# Patient Record
Sex: MALE | Race: WHITE | NOT HISPANIC OR LATINO | Employment: UNEMPLOYED | URBAN - METROPOLITAN AREA
[De-identification: names, ages, dates, MRNs, and addresses within clinical notes are randomized per-mention and may not be internally consistent; named-entity substitution may affect disease eponyms.]

---

## 2022-04-11 ENCOUNTER — HISTORICAL (OUTPATIENT)
Dept: ADMINISTRATIVE | Facility: HOSPITAL | Age: 42
End: 2022-04-11

## 2022-04-28 VITALS
WEIGHT: 184.06 LBS | SYSTOLIC BLOOD PRESSURE: 122 MMHG | DIASTOLIC BLOOD PRESSURE: 81 MMHG | HEIGHT: 72 IN | BODY MASS INDEX: 24.93 KG/M2

## 2023-09-26 ENCOUNTER — HOSPITAL ENCOUNTER (EMERGENCY)
Facility: HOSPITAL | Age: 43
Discharge: HOME | End: 2023-09-26
Attending: INTERNAL MEDICINE
Payer: MEDICAID

## 2023-09-26 VITALS
HEIGHT: 72 IN | WEIGHT: 200.63 LBS | SYSTOLIC BLOOD PRESSURE: 111 MMHG | TEMPERATURE: 98 F | BODY MASS INDEX: 27.17 KG/M2 | DIASTOLIC BLOOD PRESSURE: 73 MMHG | OXYGEN SATURATION: 100 % | RESPIRATION RATE: 18 BRPM | HEART RATE: 108 BPM

## 2023-09-26 DIAGNOSIS — F19.10 POLYSUBSTANCE ABUSE: Primary | ICD-10-CM

## 2023-09-26 PROCEDURE — 99281 EMR DPT VST MAYX REQ PHY/QHP: CPT

## 2023-09-26 NOTE — ED NOTES
Pt asked if he could go out side and smoke.  Informed pt this is a non smoking facility and that he couldn't go smoke.  States he is just going to go home and left the er.  Md notified

## 2023-09-26 NOTE — ED PROVIDER NOTES
"Encounter Date: 9/26/2023       History     Chief Complaint   Patient presents with    Mental Health Problem     PT STATES HE IS STRESSED OUT AND NOT SURE WHAT TO DO.  DENIES SI/HI.  -VH + AH.  REQUESTING REFILL ON MEDS.  STATES HE IS DETOXING OFF OF "KRATOM"  PT FIDGETY IN TRIAGE, PT WANDED.      Presents requesting help with drug abuse. States having difficulties keeping a job and is having problems with his parents. States abuses of meth, kratom and opioids.   States was in a rehabilitation facility around 3 months ago, 28 days program, but relapsed. Taking Seroquel and Prozac. Denies SI, H or hallucinations.     The history is provided by the patient.     Review of patient's allergies indicates:  No Known Allergies  History reviewed. No pertinent past medical history.  History reviewed. No pertinent surgical history.  History reviewed. No pertinent family history.  Social History     Tobacco Use    Smoking status: Every Day     Current packs/day: 1.00     Average packs/day: 1 pack/day for 24.7 years (24.7 ttl pk-yrs)     Types: Cigarettes     Start date: 1999    Smokeless tobacco: Never   Substance Use Topics    Alcohol use: Not Currently    Drug use: Yes     Types: Methamphetamines     Comment: "KRATOM"     Review of Systems   Constitutional:  Negative for fever.   HENT:  Negative for sore throat.    Respiratory:  Negative for shortness of breath.    Cardiovascular:  Negative for chest pain.   Gastrointestinal:  Negative for nausea.   Genitourinary:  Negative for dysuria.   Musculoskeletal:  Negative for back pain.   Skin:  Negative for rash.   Neurological:  Negative for weakness.   Hematological:  Does not bruise/bleed easily.   Psychiatric/Behavioral:  Negative for confusion, hallucinations, self-injury and suicidal ideas. The patient is nervous/anxious.    All other systems reviewed and are negative.      Physical Exam     Initial Vitals [09/26/23 0857]   BP Pulse Resp Temp SpO2   111/73 108 18 97.9 °F " (36.6 °C) 100 %      MAP       --         Physical Exam    Nursing note and vitals reviewed.  Constitutional: He appears well-developed and well-nourished. No distress.   HENT:   Head: Normocephalic and atraumatic.   Mouth/Throat: Oropharynx is clear and moist.   Eyes: Conjunctivae and EOM are normal. Pupils are equal, round, and reactive to light.   Neck: Neck supple. No JVD present.   Normal range of motion.  Cardiovascular:  Regular rhythm, normal heart sounds and intact distal pulses.           Pulmonary/Chest: Breath sounds normal. No respiratory distress.   Abdominal: Abdomen is soft. Bowel sounds are normal. He exhibits no distension. There is no abdominal tenderness. There is no rebound and no guarding.   Musculoskeletal:         General: No edema. Normal range of motion.      Cervical back: Normal range of motion and neck supple.     Neurological: He is alert and oriented to person, place, and time. He has normal strength. GCS score is 15. GCS eye subscore is 4. GCS verbal subscore is 5. GCS motor subscore is 6.   Skin: Skin is warm and dry. No rash noted.   Psychiatric: His speech is normal and behavior is normal. Thought content normal. He is not actively hallucinating. Thought content is not paranoid and not delusional. Cognition and memory are normal. He expresses inappropriate judgment. He expresses no homicidal and no suicidal ideation. He is attentive.         ED Course   Procedures  Labs Reviewed - No data to display       Imaging Results    None          Medications - No data to display  Medical Decision Making  Amount and/or Complexity of Data Reviewed  Discussion of management or test interpretation with external provider(s): Case discussed with [facility] placement assistance      Additional MDM:   Differential Diagnosis:   Schizophrenia exacerbation, bipolar psychosis, drug induced psychosis, thyrotoxicosis, renal failure, liver failure, toxydrome, among others                                Clinical Impression:   Final diagnoses:  [F19.10] Polysubstance abuse (Primary)        ED Disposition Condition    Discharge Stable          ED Prescriptions    None       Follow-up Information       Follow up With Specialties Details Why Contact Info    [facility] - Emergency Dept Emergency Medicine  If symptoms worsen [address] [id number] [phone]    [facility], Psychiatry, Psychology Schedule an appointment as soon as possible for a visit in 2 weeks  [address] [place] [address]  [phone]      [facility]  Schedule an appointment as soon as possible for a visit in 2 days  [address] [address]  [phone]               Ayad Cerda MD  09/26/23 0957

## 2023-12-11 PROBLEM — F11.90 OPIOID USE DISORDER: Status: ACTIVE | Noted: 2023-12-11

## 2023-12-11 PROBLEM — F15.20 AMPHETAMINE USE DISORDER, SEVERE: Status: ACTIVE | Noted: 2023-12-11

## 2023-12-11 PROBLEM — F19.94 SUBSTANCE INDUCED MOOD DISORDER: Status: ACTIVE | Noted: 2023-12-11

## 2023-12-11 PROBLEM — F10.20 ALCOHOL USE DISORDER, SEVERE, DEPENDENCE: Status: ACTIVE | Noted: 2023-12-11

## 2023-12-11 PROBLEM — F14.10 COCAINE USE DISORDER: Status: ACTIVE | Noted: 2023-12-11

## 2023-12-12 PROBLEM — G40.909 SEIZURE DISORDER: Status: ACTIVE | Noted: 2023-12-12

## 2023-12-12 PROBLEM — F33.3 SEVERE RECURRENT MAJOR DEPRESSION WITH PSYCHOTIC FEATURES: Status: ACTIVE | Noted: 2023-12-12

## 2023-12-12 PROBLEM — F17.200 NICOTINE USE DISORDER: Status: ACTIVE | Noted: 2023-12-12

## 2023-12-12 PROBLEM — R74.01 TRANSAMINITIS: Status: ACTIVE | Noted: 2023-12-12

## 2023-12-25 ENCOUNTER — HOSPITAL ENCOUNTER (EMERGENCY)
Facility: HOSPITAL | Age: 43
Discharge: HOME | End: 2023-12-25
Attending: STUDENT IN AN ORGANIZED HEALTH CARE EDUCATION/TRAINING PROGRAM
Payer: MEDICAID

## 2023-12-25 VITALS
HEART RATE: 119 BPM | DIASTOLIC BLOOD PRESSURE: 70 MMHG | HEIGHT: 72 IN | SYSTOLIC BLOOD PRESSURE: 132 MMHG | OXYGEN SATURATION: 99 % | WEIGHT: 225 LBS | RESPIRATION RATE: 22 BRPM | TEMPERATURE: 99 F | BODY MASS INDEX: 30.48 KG/M2

## 2023-12-25 DIAGNOSIS — F19.10 POLYSUBSTANCE ABUSE: ICD-10-CM

## 2023-12-25 DIAGNOSIS — F15.10 METHAMPHETAMINE ABUSE: ICD-10-CM

## 2023-12-25 DIAGNOSIS — Z00.8 MEDICAL CLEARANCE FOR PSYCHIATRIC ADMISSION: Primary | ICD-10-CM

## 2023-12-25 LAB
ALBUMIN SERPL-MCNC: 4.3 G/DL (ref 3.5–5)
ALBUMIN/GLOB SERPL: 1.5 RATIO (ref 1.1–2)
ALP SERPL-CCNC: 64 UNIT/L (ref 40–150)
ALT SERPL-CCNC: 42 UNIT/L (ref 0–55)
AMPHET UR QL SCN: POSITIVE
APAP SERPL-MCNC: <17.4 UG/ML (ref 17.4–30)
APPEARANCE UR: CLEAR
AST SERPL-CCNC: 102 UNIT/L (ref 5–34)
BACTERIA #/AREA URNS AUTO: ABNORMAL /HPF
BARBITURATE SCN PRESENT UR: NEGATIVE
BASOPHILS # BLD AUTO: 0.04 X10(3)/MCL
BASOPHILS NFR BLD AUTO: 0.3 %
BENZODIAZ UR QL SCN: POSITIVE
BILIRUB SERPL-MCNC: 2.9 MG/DL
BILIRUB UR QL STRIP.AUTO: NEGATIVE
BUN SERPL-MCNC: 22.4 MG/DL (ref 8.9–20.6)
CALCIUM SERPL-MCNC: 8.7 MG/DL (ref 8.4–10.2)
CANNABINOIDS UR QL SCN: NEGATIVE
CASTS URNS MICRO: ABNORMAL /LPF
CHLORIDE SERPL-SCNC: 98 MMOL/L (ref 98–107)
CO2 SERPL-SCNC: 21 MMOL/L (ref 22–29)
COCAINE UR QL SCN: NEGATIVE
COLOR UR AUTO: ABNORMAL
CREAT SERPL-MCNC: 0.96 MG/DL (ref 0.73–1.18)
EOSINOPHIL # BLD AUTO: 0.08 X10(3)/MCL (ref 0–0.9)
EOSINOPHIL NFR BLD AUTO: 0.5 %
ERYTHROCYTE [DISTWIDTH] IN BLOOD BY AUTOMATED COUNT: 13.1 % (ref 11.5–17)
ETHANOL SERPL-MCNC: <10 MG/DL
FENTANYL UR QL SCN: NEGATIVE
FLUAV AG UPPER RESP QL IA.RAPID: NOT DETECTED
FLUBV AG UPPER RESP QL IA.RAPID: NOT DETECTED
GFR SERPLBLD CREATININE-BSD FMLA CKD-EPI: >60 MLS/MIN/1.73/M2
GLOBULIN SER-MCNC: 2.9 GM/DL (ref 2.4–3.5)
GLUCOSE SERPL-MCNC: 86 MG/DL (ref 74–100)
GLUCOSE UR QL STRIP.AUTO: NORMAL
HCT VFR BLD AUTO: 38.3 % (ref 42–52)
HGB BLD-MCNC: 13.1 G/DL (ref 14–18)
IMM GRANULOCYTES # BLD AUTO: 0.08 X10(3)/MCL (ref 0–0.04)
IMM GRANULOCYTES NFR BLD AUTO: 0.5 %
KETONES UR QL STRIP.AUTO: ABNORMAL
LEUKOCYTE ESTERASE UR QL STRIP.AUTO: NEGATIVE
LYMPHOCYTES # BLD AUTO: 1.26 X10(3)/MCL (ref 0.6–4.6)
LYMPHOCYTES NFR BLD AUTO: 8.3 %
MCH RBC QN AUTO: 30.8 PG (ref 27–31)
MCHC RBC AUTO-ENTMCNC: 34.2 G/DL (ref 33–36)
MCV RBC AUTO: 90.1 FL (ref 80–94)
MDMA UR QL SCN: NEGATIVE
MONOCYTES # BLD AUTO: 0.98 X10(3)/MCL (ref 0.1–1.3)
MONOCYTES NFR BLD AUTO: 6.5 %
NEUTROPHILS # BLD AUTO: 12.66 X10(3)/MCL (ref 2.1–9.2)
NEUTROPHILS NFR BLD AUTO: 83.9 %
NITRITE UR QL STRIP.AUTO: NEGATIVE
NRBC BLD AUTO-RTO: 0 %
OPIATES UR QL SCN: NEGATIVE
PCP UR QL: NEGATIVE
PH UR STRIP.AUTO: 5.5 [PH]
PH UR: 5.5 [PH] (ref 3–11)
PLATELET # BLD AUTO: 174 X10(3)/MCL (ref 130–400)
PMV BLD AUTO: 10.7 FL (ref 7.4–10.4)
POTASSIUM SERPL-SCNC: 3 MMOL/L (ref 3.5–5.1)
PROT SERPL-MCNC: 7.2 GM/DL (ref 6.4–8.3)
PROT UR QL STRIP.AUTO: NEGATIVE
RBC # BLD AUTO: 4.25 X10(6)/MCL (ref 4.7–6.1)
RBC #/AREA URNS AUTO: ABNORMAL /HPF
RBC UR QL AUTO: NEGATIVE
SARS-COV-2 RNA RESP QL NAA+PROBE: NOT DETECTED
SODIUM SERPL-SCNC: 133 MMOL/L (ref 136–145)
SP GR UR STRIP.AUTO: 1.02 (ref 1–1.03)
SPECIFIC GRAVITY, URINE AUTO (.000) (OHS): 1.02 (ref 1–1.03)
SQUAMOUS #/AREA URNS LPF: ABNORMAL /HPF
TSH SERPL-ACNC: 2.34 UIU/ML (ref 0.35–4.94)
UROBILINOGEN UR STRIP-ACNC: NORMAL
WBC # SPEC AUTO: 15.1 X10(3)/MCL (ref 4.5–11.5)
WBC #/AREA URNS AUTO: ABNORMAL /HPF

## 2023-12-25 PROCEDURE — 80307 DRUG TEST PRSMV CHEM ANLYZR: CPT | Performed by: STUDENT IN AN ORGANIZED HEALTH CARE EDUCATION/TRAINING PROGRAM

## 2023-12-25 PROCEDURE — 25000003 PHARM REV CODE 250: Performed by: STUDENT IN AN ORGANIZED HEALTH CARE EDUCATION/TRAINING PROGRAM

## 2023-12-25 PROCEDURE — 0240U COVID/FLU A&B PCR: CPT | Performed by: STUDENT IN AN ORGANIZED HEALTH CARE EDUCATION/TRAINING PROGRAM

## 2023-12-25 PROCEDURE — 82077 ASSAY SPEC XCP UR&BREATH IA: CPT | Performed by: STUDENT IN AN ORGANIZED HEALTH CARE EDUCATION/TRAINING PROGRAM

## 2023-12-25 PROCEDURE — 85025 COMPLETE CBC W/AUTO DIFF WBC: CPT | Performed by: STUDENT IN AN ORGANIZED HEALTH CARE EDUCATION/TRAINING PROGRAM

## 2023-12-25 PROCEDURE — 80053 COMPREHEN METABOLIC PANEL: CPT | Performed by: STUDENT IN AN ORGANIZED HEALTH CARE EDUCATION/TRAINING PROGRAM

## 2023-12-25 PROCEDURE — 80143 DRUG ASSAY ACETAMINOPHEN: CPT | Performed by: STUDENT IN AN ORGANIZED HEALTH CARE EDUCATION/TRAINING PROGRAM

## 2023-12-25 PROCEDURE — 99283 EMERGENCY DEPT VISIT LOW MDM: CPT

## 2023-12-25 PROCEDURE — 84443 ASSAY THYROID STIM HORMONE: CPT | Performed by: STUDENT IN AN ORGANIZED HEALTH CARE EDUCATION/TRAINING PROGRAM

## 2023-12-25 PROCEDURE — 81001 URINALYSIS AUTO W/SCOPE: CPT | Mod: XB | Performed by: STUDENT IN AN ORGANIZED HEALTH CARE EDUCATION/TRAINING PROGRAM

## 2023-12-25 RX ORDER — BUPRENORPHINE HYDROCHLORIDE 8 MG/1
8 TABLET SUBLINGUAL
Status: COMPLETED | OUTPATIENT
Start: 2023-12-25 | End: 2023-12-25

## 2023-12-25 RX ADMIN — BUPRENORPHINE 8 MG: 8 TABLET SUBLINGUAL at 06:12

## 2023-12-26 NOTE — DISCHARGE INSTRUCTIONS
Please go to detox and rehab immediately after discharge.      Return to the emergency department for any new or worsening symptoms, fever, difficulty breathing, chest pain, or any other concerns.

## 2023-12-26 NOTE — ED PROVIDER NOTES
"Encounter Date: 12/25/2023    SCRIBE #1 NOTE: I, Gina Perkins, am scribing for, and in the presence of,  Adrian Arellano MD. I have scribed the following portions of the note - Other sections scribed: HPI, ROS, PE.       History     Chief Complaint   Patient presents with    Mental Health Problem     Pt reports "I am struggling mentally". Denies SI/HI. Reports last meth use yesterday.      Patient is a 43 year old male with a history of bipolar disorder and schizophrenia that presents to the ED for paranoia onset a couple of days ago. Patient reports he is "not thinking right" and having visual hallucinations. Patient states he last used meth yesterday. He denies SI and HI.    The history is provided by the patient and medical records. No  was used.   Mental Health Problem  The primary symptoms include hallucinations. The primary symptoms do not include homicidal ideas or suicidal ideas. The current episode started two days ago.   Additional symptoms of the illness do not include abdominal pain. He does not admit to suicidal ideas. He does not have a plan to attempt suicide. He does not contemplate harming himself. He has not already injured self. He does not contemplate injuring another person. He has not already  injured another person. Risk factors that are present for mental illness include a history of mental illness and substance abuse.     Review of patient's allergies indicates:  No Known Allergies  Past Medical History:   Diagnosis Date    Bipolar disorder, unspecified     Schizophrenia, unspecified     Seizures      History reviewed. No pertinent surgical history.  Family History   Problem Relation Age of Onset    No Known Problems Mother     No Known Problems Father      Social History     Tobacco Use    Smoking status: Every Day     Current packs/day: 1.00     Average packs/day: 1 pack/day for 25.0 years (25.0 ttl pk-yrs)     Types: Cigarettes     Start date: 1999    Smokeless tobacco: " "Never   Substance Use Topics    Alcohol use: Not Currently    Drug use: Yes     Types: Methamphetamines, Fentanyl     Comment: "KRATOM"     Review of Systems   Constitutional:  Negative for fever.   HENT:  Negative for sore throat.    Eyes:  Negative for visual disturbance.   Respiratory:  Negative for shortness of breath.    Cardiovascular:  Negative for chest pain.   Gastrointestinal:  Negative for abdominal pain.   Genitourinary:  Negative for dysuria.   Musculoskeletal:  Negative for joint swelling.   Skin:  Negative for rash.   Neurological:  Negative for weakness.   Psychiatric/Behavioral:  Positive for hallucinations. Negative for confusion, homicidal ideas and suicidal ideas.         Paranoia   All other systems reviewed and are negative.      Physical Exam     Initial Vitals [12/25/23 1717]   BP Pulse Resp Temp SpO2   132/68 (!) 120 (!) 22 99.3 °F (37.4 °C) 98 %      MAP       --         Physical Exam    Nursing note and vitals reviewed.  Constitutional: He appears well-developed and well-nourished. He is not diaphoretic. No distress.   HENT:   Head: Normocephalic and atraumatic.   Eyes: Conjunctivae and EOM are normal. Pupils are equal, round, and reactive to light.   Neck:   Normal range of motion.  Cardiovascular:  Regular rhythm, normal heart sounds and intact distal pulses.   Tachycardia present.         No murmur heard.  Pulmonary/Chest: Breath sounds normal. No respiratory distress. He has no wheezes. He has no rales.   Abdominal: Abdomen is soft. He exhibits no distension. There is no abdominal tenderness.   Musculoskeletal:         General: No tenderness or edema. Normal range of motion.      Cervical back: Normal range of motion.     Neurological: He is alert and oriented to person, place, and time. No cranial nerve deficit.   Skin: Skin is warm and dry. Capillary refill takes less than 2 seconds. No rash noted. No erythema.   Psychiatric: His affect is labile. Thought content is paranoid. He " expresses no homicidal and no suicidal ideation. He expresses no suicidal plans and no homicidal plans.   Restless He is inattentive.         ED Course   Procedures  Labs Reviewed   COMPREHENSIVE METABOLIC PANEL - Abnormal; Notable for the following components:       Result Value    Sodium Level 133 (*)     Potassium Level 3.0 (*)     Carbon Dioxide 21 (*)     Blood Urea Nitrogen 22.4 (*)     Bilirubin Total 2.9 (*)     Aspartate Aminotransferase 102 (*)     All other components within normal limits   URINALYSIS, REFLEX TO URINE CULTURE - Abnormal; Notable for the following components:    Ketones, UA 2+ (*)     Unclassified Cast, UA 0-2 (*)     All other components within normal limits   DRUG SCREEN, URINE (BEAKER) - Abnormal; Notable for the following components:    Amphetamines, Urine Positive (*)     Benzodiazepine, Urine Positive (*)     All other components within normal limits    Narrative:     Cut off concentrations:    Amphetamines - 1000 ng/ml  Barbiturates - 200 ng/ml  Benzodiazepine - 200 ng/ml  Cannabinoids (THC) - 50 ng/ml  Cocaine - 300 ng/ml  Fentanyl - 1.0 ng/ml  MDMA - 500 ng/ml  Opiates - 300 ng/ml   Phencyclidine (PCP) - 25 ng/ml    Specimen submitted for drug analysis and tested for pH and specific gravity in order to evaluate sample integrity. Suspect tampering if specific gravity is <1.003 and/or pH is not within the range of 4.5 - 8.0  False negatives may result form substances such as bleach added to urine.  False positives may result for the presence of a substance with similar chemical structure to the drug or its metabolite.    This test provides only a PRELIMINARY analytical test result. A more specific alternate chemical method must be used in order to obtain a confirmed analytical result. Gas chromatography/mass spectrometry (GC/MS) is the preferred confirmatory method. Other chemical confirmation methods are available. Clinical consideration and professional judgement should be applied  to any drug of abuse test result, particularly when preliminary positive results are used.    Positive results will be confirmed only at the physicians request. Unconfirmed screening results are to be used only for medical purposes (treatment).        ACETAMINOPHEN LEVEL - Abnormal; Notable for the following components:    Acetaminophen Level <17.4 (*)     All other components within normal limits   CBC WITH DIFFERENTIAL - Abnormal; Notable for the following components:    WBC 15.10 (*)     RBC 4.25 (*)     Hgb 13.1 (*)     Hct 38.3 (*)     MPV 10.7 (*)     Neut # 12.66 (*)     IG# 0.08 (*)     All other components within normal limits   TSH - Normal   ALCOHOL,MEDICAL (ETHANOL) - Normal   COVID/FLU A&B PCR - Normal    Narrative:     The Xpert Xpress SARS-CoV-2/FLU/RSV plus is a rapid, multiplexed real-time PCR test intended for the simultaneous qualitative detection and differentiation of SARS-CoV-2, Influenza A, Influenza B, and respiratory syncytial virus (RSV) viral RNA in either nasopharyngeal swab or nasal swab specimens.         CBC W/ AUTO DIFFERENTIAL    Narrative:     The following orders were created for panel order CBC auto differential.  Procedure                               Abnormality         Status                     ---------                               -----------         ------                     CBC with Differential[[id number]]       Abnormal            Final result                 Please view results for these tests on the individual orders.          Imaging Results    None          Medications   buprenorphine HCL SL tablet 8 mg (8 mg Sublingual Given 12/25/23 3590)     Medical Decision Making  Problems Addressed:  Medical clearance for psychiatric admission: acute illness or injury that poses a threat to life or bodily functions  Methamphetamine abuse: acute illness or injury that poses a threat to life or bodily functions  Polysubstance abuse: acute illness or injury that poses a threat  to life or bodily functions    Amount and/or Complexity of Data Reviewed  External Data Reviewed: notes.     Details: Reviewed old records, history of mental health disease.  Labs: ordered.    Risk  Prescription drug management.  Parenteral controlled substances.  Decision regarding hospitalization.  Diagnosis or treatment significantly limited by social determinants of health.            Scribe Attestation:   Scribe #1: I performed the above scribed service and the documentation accurately describes the services I performed. I attest to the accuracy of the note.    Attending Attestation:           Physician Attestation for Scribe:  Physician Attestation Statement for Scribe #1: I, Adrian Arellano MD, reviewed documentation, as scribed by Gina Perkins in my presence, and it is both accurate and complete.                        Medical Decision Making:   History:   Old Medical Records: I decided to obtain old medical records.  Old Records Summarized: records from clinic visits, records from previous admission(s) and records from another hospital.       <> Summary of Records: Reviewed old records, history of polysubstance abuse  Initial Assessment:   Psych evaluation  Differential Diagnosis:   suicidal ideations, homicidal ideations, auditory or visual hallucinations, drug/etoh intoxication, bipolar disorder, schizophrenia, schizoaffective, delusional disorder, major depressive disorder, PTSD, substance induced mood disorder, violent behavior, suicidal attempt, non-psychiatric medical illness, malingering      Clinical Tests:   Lab Tests: Ordered and Reviewed  ED Management:  Patient is a 43-year-old male who presents to the emergency department for mental health evaluation.  He has a history of polysubstance use and last use meth yesterday.  States he was having issues after the meth use.  He does smoke cigarettes.  He has used fentanyl in the past.  He is also used Kartom.  He denies any SI or HI.  Does not currently  meet criteria for legal hold but would benefit from detox and rehab.  He likely has substance induced mood disorder.  He was open to seeking rehab and detox.  Consultation to [facility]  [facility] has evaluated the patient.  They have found a detox and rehab facility for the patient.  He will go immediately to detox after discharge.  [facility] transport.  All results discussed with the patient.  Counseled extensively.  Answered all questions at this time.  Patient verbalized understanding agreed to plan.  Hemodynamically stable for continued mental health evaluation, detox and rehab.               Clinical Impression:  Final diagnoses:  [Z00.8] Medical clearance for psychiatric admission (Primary)  [F19.10] Polysubstance abuse  [F15.10] Methamphetamine abuse          ED Disposition Condition    Discharge Stable          ED Prescriptions    None       Follow-up Information       Follow up With Specialties Details Why Contact Info    Primary Care  Call in 1 day  Please call [phone] for a primary care provider.             Adrian Arellano MD  01/07/24 2599

## 2024-02-18 ENCOUNTER — HOSPITAL ENCOUNTER (EMERGENCY)
Facility: HOSPITAL | Age: 44
Discharge: PSYCHIATRIC OR SUBSTANCE USE FACILITY | End: 2024-02-19
Attending: STUDENT IN AN ORGANIZED HEALTH CARE EDUCATION/TRAINING PROGRAM
Payer: MEDICAID

## 2024-02-18 DIAGNOSIS — F23 ACUTE PSYCHOSIS: ICD-10-CM

## 2024-02-18 DIAGNOSIS — Z00.8 MEDICAL CLEARANCE FOR PSYCHIATRIC ADMISSION: Primary | ICD-10-CM

## 2024-02-18 DIAGNOSIS — F20.9 SCHIZOPHRENIA, UNSPECIFIED TYPE: ICD-10-CM

## 2024-02-18 DIAGNOSIS — F31.9 BIPOLAR 1 DISORDER: ICD-10-CM

## 2024-02-18 LAB
ALBUMIN SERPL-MCNC: 4.8 G/DL (ref 3.5–5)
ALBUMIN/GLOB SERPL: 1.4 RATIO (ref 1.1–2)
ALP SERPL-CCNC: 71 UNIT/L (ref 40–150)
ALT SERPL-CCNC: 27 UNIT/L (ref 0–55)
AMPHET UR QL SCN: POSITIVE
ANION GAP SERPL CALC-SCNC: 18 MMOL/L (ref 8–16)
APAP SERPL-MCNC: <17.4 UG/ML (ref 17.4–30)
APPEARANCE UR: CLEAR
AST SERPL-CCNC: 30 UNIT/L (ref 5–34)
BACTERIA #/AREA URNS AUTO: ABNORMAL /HPF
BARBITURATE SCN PRESENT UR: NEGATIVE
BASOPHILS # BLD AUTO: 0.03 X10(3)/MCL
BASOPHILS NFR BLD AUTO: 0.2 %
BENZODIAZ UR QL SCN: NEGATIVE
BILIRUB SERPL-MCNC: 1.7 MG/DL
BILIRUB UR QL STRIP.AUTO: NEGATIVE
BUN SERPL-MCNC: 26 MG/DL (ref 6–30)
BUN SERPL-MCNC: 32.2 MG/DL (ref 8.9–20.6)
CALCIUM SERPL-MCNC: 10 MG/DL (ref 8.4–10.2)
CANNABINOIDS UR QL SCN: NEGATIVE
CHLORIDE SERPL-SCNC: 103 MMOL/L (ref 98–107)
CHLORIDE SERPL-SCNC: 104 MMOL/L (ref 95–110)
CO2 SERPL-SCNC: 20 MMOL/L (ref 22–29)
COCAINE UR QL SCN: NEGATIVE
COLOR UR AUTO: YELLOW
CREAT SERPL-MCNC: 1 MG/DL (ref 0.5–1.4)
CREAT SERPL-MCNC: 1.23 MG/DL (ref 0.73–1.18)
CTP QC/QA: YES
EOSINOPHIL # BLD AUTO: 0 X10(3)/MCL (ref 0–0.9)
EOSINOPHIL NFR BLD AUTO: 0 %
ERYTHROCYTE [DISTWIDTH] IN BLOOD BY AUTOMATED COUNT: 12.6 % (ref 11.5–17)
ETHANOL SERPL-MCNC: <10 MG/DL
FENTANYL UR QL SCN: POSITIVE
GFR SERPLBLD CREATININE-BSD FMLA CKD-EPI: >60 MLS/MIN/1.73/M2
GLOBULIN SER-MCNC: 3.4 GM/DL (ref 2.4–3.5)
GLUCOSE SERPL-MCNC: 108 MG/DL (ref 74–100)
GLUCOSE SERPL-MCNC: 137 MG/DL (ref 70–110)
GLUCOSE UR QL STRIP.AUTO: NORMAL
HCT VFR BLD AUTO: 42.9 % (ref 42–52)
HCT VFR BLD CALC: 39 %PCV (ref 36–54)
HGB BLD-MCNC: 13 G/DL
HGB BLD-MCNC: 14.7 G/DL (ref 14–18)
HYALINE CASTS #/AREA URNS LPF: ABNORMAL /LPF
IMM GRANULOCYTES # BLD AUTO: 0.06 X10(3)/MCL (ref 0–0.04)
IMM GRANULOCYTES NFR BLD AUTO: 0.4 %
KETONES UR QL STRIP.AUTO: ABNORMAL
LEUKOCYTE ESTERASE UR QL STRIP.AUTO: NEGATIVE
LYMPHOCYTES # BLD AUTO: 1.25 X10(3)/MCL (ref 0.6–4.6)
LYMPHOCYTES NFR BLD AUTO: 8.5 %
MCH RBC QN AUTO: 29.7 PG (ref 27–31)
MCHC RBC AUTO-ENTMCNC: 34.3 G/DL (ref 33–36)
MCV RBC AUTO: 86.7 FL (ref 80–94)
MDMA UR QL SCN: NEGATIVE
MONOCYTES # BLD AUTO: 1.04 X10(3)/MCL (ref 0.1–1.3)
MONOCYTES NFR BLD AUTO: 7.1 %
MUCOUS THREADS URNS QL MICRO: ABNORMAL /LPF
NEUTROPHILS # BLD AUTO: 12.32 X10(3)/MCL (ref 2.1–9.2)
NEUTROPHILS NFR BLD AUTO: 83.8 %
NITRITE UR QL STRIP.AUTO: NEGATIVE
NRBC BLD AUTO-RTO: 0 %
OPIATES UR QL SCN: NEGATIVE
PCP UR QL: NEGATIVE
PH UR STRIP.AUTO: 5.5 [PH]
PH UR: 5.5 [PH] (ref 3–11)
PLATELET # BLD AUTO: 258 X10(3)/MCL (ref 130–400)
PMV BLD AUTO: 11.2 FL (ref 7.4–10.4)
POC IONIZED CALCIUM: 1.17 MMOL/L (ref 1.06–1.42)
POC TCO2 (MEASURED): 23 MMOL/L (ref 23–29)
POTASSIUM BLD-SCNC: 3.7 MMOL/L (ref 3.5–5.1)
POTASSIUM SERPL-SCNC: 4.5 MMOL/L (ref 3.5–5.1)
PROT SERPL-MCNC: 8.2 GM/DL (ref 6.4–8.3)
PROT UR QL STRIP.AUTO: ABNORMAL
RBC # BLD AUTO: 4.95 X10(6)/MCL (ref 4.7–6.1)
RBC #/AREA URNS AUTO: ABNORMAL /HPF
RBC UR QL AUTO: NEGATIVE
SAMPLE: ABNORMAL
SARS-COV-2 AG RESP QL IA.RAPID: NEGATIVE
SARS-COV-2 RDRP RESP QL NAA+PROBE: NEGATIVE
SODIUM BLD-SCNC: 141 MMOL/L (ref 136–145)
SODIUM SERPL-SCNC: 137 MMOL/L (ref 136–145)
SP GR UR STRIP.AUTO: 1.03 (ref 1–1.03)
SPECIFIC GRAVITY, URINE AUTO (.000) (OHS): 1.03 (ref 1–1.03)
SQUAMOUS #/AREA URNS LPF: ABNORMAL /HPF
TSH SERPL-ACNC: 0.94 UIU/ML (ref 0.35–4.94)
UROBILINOGEN UR STRIP-ACNC: NORMAL
WBC # SPEC AUTO: 14.7 X10(3)/MCL (ref 4.5–11.5)
WBC #/AREA URNS AUTO: ABNORMAL /HPF

## 2024-02-18 PROCEDURE — 80143 DRUG ASSAY ACETAMINOPHEN: CPT | Performed by: STUDENT IN AN ORGANIZED HEALTH CARE EDUCATION/TRAINING PROGRAM

## 2024-02-18 PROCEDURE — 96361 HYDRATE IV INFUSION ADD-ON: CPT

## 2024-02-18 PROCEDURE — 82077 ASSAY SPEC XCP UR&BREATH IA: CPT | Performed by: STUDENT IN AN ORGANIZED HEALTH CARE EDUCATION/TRAINING PROGRAM

## 2024-02-18 PROCEDURE — 85025 COMPLETE CBC W/AUTO DIFF WBC: CPT | Performed by: STUDENT IN AN ORGANIZED HEALTH CARE EDUCATION/TRAINING PROGRAM

## 2024-02-18 PROCEDURE — 84443 ASSAY THYROID STIM HORMONE: CPT | Performed by: STUDENT IN AN ORGANIZED HEALTH CARE EDUCATION/TRAINING PROGRAM

## 2024-02-18 PROCEDURE — 81001 URINALYSIS AUTO W/SCOPE: CPT | Mod: XB | Performed by: STUDENT IN AN ORGANIZED HEALTH CARE EDUCATION/TRAINING PROGRAM

## 2024-02-18 PROCEDURE — 25000003 PHARM REV CODE 250: Performed by: STUDENT IN AN ORGANIZED HEALTH CARE EDUCATION/TRAINING PROGRAM

## 2024-02-18 PROCEDURE — 99285 EMERGENCY DEPT VISIT HI MDM: CPT | Mod: 25

## 2024-02-18 PROCEDURE — 80053 COMPREHEN METABOLIC PANEL: CPT | Performed by: STUDENT IN AN ORGANIZED HEALTH CARE EDUCATION/TRAINING PROGRAM

## 2024-02-18 PROCEDURE — 80307 DRUG TEST PRSMV CHEM ANLYZR: CPT | Performed by: STUDENT IN AN ORGANIZED HEALTH CARE EDUCATION/TRAINING PROGRAM

## 2024-02-18 PROCEDURE — 87635 SARS-COV-2 COVID-19 AMP PRB: CPT | Performed by: STUDENT IN AN ORGANIZED HEALTH CARE EDUCATION/TRAINING PROGRAM

## 2024-02-18 PROCEDURE — 96360 HYDRATION IV INFUSION INIT: CPT

## 2024-02-18 RX ORDER — OLANZAPINE 10 MG/1
10 TABLET ORAL NIGHTLY
Status: ON HOLD | COMMUNITY
Start: 2024-02-09 | End: 2024-05-03 | Stop reason: HOSPADM

## 2024-02-18 RX ORDER — SODIUM CHLORIDE 9 MG/ML
1000 INJECTION, SOLUTION INTRAVENOUS
Status: COMPLETED | OUTPATIENT
Start: 2024-02-18 | End: 2024-02-18

## 2024-02-18 RX ORDER — BUPRENORPHINE 300 MG/1
300 SOLUTION SUBCUTANEOUS
Status: ON HOLD | COMMUNITY
Start: 2024-01-18 | End: 2024-05-23 | Stop reason: HOSPADM

## 2024-02-18 RX ORDER — BENZTROPINE MESYLATE 0.5 MG/1
0.5 TABLET ORAL DAILY
Status: ON HOLD | COMMUNITY
Start: 2023-11-14 | End: 2024-05-03 | Stop reason: HOSPADM

## 2024-02-18 RX ORDER — VENLAFAXINE HYDROCHLORIDE 37.5 MG/1
37.5 CAPSULE, EXTENDED RELEASE ORAL DAILY
Status: ON HOLD | COMMUNITY
Start: 2024-02-09 | End: 2024-05-03 | Stop reason: HOSPADM

## 2024-02-18 RX ORDER — OLANZAPINE 5 MG/1
20 TABLET, ORALLY DISINTEGRATING ORAL
Status: COMPLETED | OUTPATIENT
Start: 2024-02-18 | End: 2024-02-18

## 2024-02-18 RX ADMIN — SODIUM CHLORIDE 1000 ML: 9 INJECTION, SOLUTION INTRAVENOUS at 08:02

## 2024-02-18 RX ADMIN — OLANZAPINE 20 MG: 5 TABLET, ORALLY DISINTEGRATING ORAL at 06:02

## 2024-02-18 RX ADMIN — SODIUM CHLORIDE 1000 ML: 9 INJECTION, SOLUTION INTRAVENOUS at 06:02

## 2024-02-18 NOTE — ED PROVIDER NOTES
"Encounter Date: 2/18/2024    SCRIBE #1 NOTE: I, Gina Perkins, am scribing for, and in the presence of,  Adelso Petersen IV, MD. I have scribed the following portions of the note - Other sections scribed: HPI, ROS, PE.       History     Chief Complaint   Patient presents with    Psychiatric Evaluation     Pt reports under a lot of stress and recent change in meds. Pt feels like he cannot keep his life together. Reports going to facility today but was unable to get int. Last alcohol use was yesterday. Jimenez SI/HI.      Patient is a 43 year old male with a history of bipolar disorder and schizophrenia that presents to the ED for paranoia and stress. Patient states he occasionally has visual and auditory hallucinations. He reports medication changes. Patient states he is mad at himself for not talking to his family. He reports he is trying to avoid problems for his father. Patient states he has been having "not good" thoughts and his mind has been racing. Patient denies SI and HI.     Patient PEC'd at 1742.    The history is provided by the patient and medical records. No  was used.     Review of patient's allergies indicates:  No Known Allergies  Past Medical History:   Diagnosis Date    Bipolar disorder, unspecified     Schizophrenia, unspecified     Seizures      No past surgical history on file.  Family History   Problem Relation Age of Onset    No Known Problems Mother     No Known Problems Father      Social History     Tobacco Use    Smoking status: Every Day     Current packs/day: 1.00     Average packs/day: 1 pack/day for 25.1 years (25.1 ttl pk-yrs)     Types: Cigarettes     Start date: 1999    Smokeless tobacco: Never   Substance Use Topics    Alcohol use: Not Currently    Drug use: Yes     Types: Methamphetamines, Fentanyl     Comment: "KRATOM"     Review of Systems   Constitutional:  Negative for chills and fever.   HENT:  Negative for congestion, rhinorrhea and sore throat.    Eyes:  " Negative for visual disturbance.   Respiratory:  Negative for cough and shortness of breath.    Cardiovascular:  Negative for chest pain.   Gastrointestinal:  Negative for abdominal pain, nausea and vomiting.   Genitourinary:  Negative for dysuria and hematuria.   Musculoskeletal:  Negative for joint swelling.   Skin:  Negative for rash.   Neurological:  Negative for weakness.   Psychiatric/Behavioral:  Positive for hallucinations. Negative for confusion, self-injury and suicidal ideas.         Paranoia    All other systems reviewed and are negative.      Physical Exam     Initial Vitals [02/18/24 1703]   BP Pulse Resp Temp SpO2   (!) 190/96 (!) 117 19 98.6 °F (37 °C) 97 %      MAP       --         Physical Exam    Nursing note and vitals reviewed.  Constitutional: He is not diaphoretic. No distress.   HENT:   Head: Normocephalic and atraumatic.   Neck: Neck supple.   Normal range of motion.  Cardiovascular:  Regular rhythm.   Tachycardia present.         Pulmonary/Chest: Breath sounds normal. No respiratory distress.   Abdominal: Abdomen is soft. He exhibits no distension. There is no abdominal tenderness.   Musculoskeletal:         General: No edema.      Cervical back: Normal range of motion and neck supple.     Neurological: He is alert and oriented to person, place, and time. He has normal strength. No cranial nerve deficit or sensory deficit.   Skin: Skin is warm. Capillary refill takes less than 2 seconds.   Psychiatric: His affect is labile.   Intermittent tearing. Pressured speech. Flight of ideas.          ED Course   Procedures  Labs Reviewed   COMPREHENSIVE METABOLIC PANEL - Abnormal; Notable for the following components:       Result Value    Carbon Dioxide 20 (*)     Glucose Level 108 (*)     Blood Urea Nitrogen 32.2 (*)     Creatinine 1.23 (*)     Bilirubin Total 1.7 (*)     All other components within normal limits   URINALYSIS, REFLEX TO URINE CULTURE - Abnormal; Notable for the following  components:    Specific Gravity, UA 1.034 (*)     Protein, UA 1+ (*)     Ketones, UA 1+ (*)     Mucous, UA Few (*)     Hyaline Casts, UA 11-20 (*)     All other components within normal limits   DRUG SCREEN, URINE (BEAKER) - Abnormal; Notable for the following components:    Amphetamines, Urine Positive (*)     Fentanyl, Urine Positive (*)     All other components within normal limits    Narrative:     Cut off concentrations:    Amphetamines - 1000 ng/ml  Barbiturates - 200 ng/ml  Benzodiazepine - 200 ng/ml  Cannabinoids (THC) - 50 ng/ml  Cocaine - 300 ng/ml  Fentanyl - 1.0 ng/ml  MDMA - 500 ng/ml  Opiates - 300 ng/ml   Phencyclidine (PCP) - 25 ng/ml    Specimen submitted for drug analysis and tested for pH and specific gravity in order to evaluate sample integrity. Suspect tampering if specific gravity is <1.003 and/or pH is not within the range of 4.5 - 8.0  False negatives may result form substances such as bleach added to urine.  False positives may result for the presence of a substance with similar chemical structure to the drug or its metabolite.    This test provides only a PRELIMINARY analytical test result. A more specific alternate chemical method must be used in order to obtain a confirmed analytical result. Gas chromatography/mass spectrometry (GC/MS) is the preferred confirmatory method. Other chemical confirmation methods are available. Clinical consideration and professional judgement should be applied to any drug of abuse test result, particularly when preliminary positive results are used.    Positive results will be confirmed only at the physicians request. Unconfirmed screening results are to be used only for medical purposes (treatment).        ACETAMINOPHEN LEVEL - Abnormal; Notable for the following components:    Acetaminophen Level <17.4 (*)     All other components within normal limits   CBC WITH DIFFERENTIAL - Abnormal; Notable for the following components:    WBC 14.70 (*)     MPV 11.2  (*)     Neut # 12.32 (*)     IG# 0.06 (*)     All other components within normal limits   ISTAT CHEM8 - Abnormal; Notable for the following components:    POC Glucose 137 (*)     POC Anion Gap 18 (*)     All other components within normal limits   TSH - Normal   ALCOHOL,MEDICAL (ETHANOL) - Normal   SARS-COV-2 RNA AMPLIFICATION, QUAL - Normal    Narrative:     The IDNOW COVID-19 assay is a rapid molecular in vitro diagnostic test utilizing an isothermal nucleic acid amplification technology intended for the qualitative detection of nucleic acid from the SARS-CoV-2 viral RNA in direct nasal, nasopharyngeal or throat swabs from individuals who are suspected of COVID-19 by their healthcare provider.   CBC W/ AUTO DIFFERENTIAL    Narrative:     The following orders were created for panel order CBC auto differential.  Procedure                               Abnormality         Status                     ---------                               -----------         ------                     CBC with Differential[[id number]]       Abnormal            Final result                 Please view results for these tests on the individual orders.   SARS CORONAVIRUS 2 ANTIGEN POCT, MANUAL READ   ISTAT CHEM8          Imaging Results    None          Medications   OLANZapine zydis disintegrating tablet 20 mg (20 mg Oral Given 2/18/24 1815)   0.9%  NaCl infusion (0 mLs Intravenous Stopped 2/18/24 2020)   0.9%  NaCl infusion (0 mLs Intravenous Stopped 2/18/24 2120)     Medical Decision Making  43-year-old male history of schizophrenia bipolar disorder presenting with racing thoughts paranoia visual and auditory hallucinations denies SI HI at this time reports his medicines were recently decreased in dosage reports that he tried to go voluntarily to a psych facility but they were full and declined him.  Exam as above patient is clearly a danger to himself others there was unable to obtain voluntary admission today so will file  PEC    Differential diagnosis includes, but is not limited to;  suicidal ideations, homicidal ideations, auditory or visual hallucinations, drug/etoh intoxication, bipolar disorder, schizophrenia, schizoaffective, delusional disorder, major depressive disorder, PTSD, substance induced mood disorder, violent behavior, suicidal attempt, non-psychiatric medical illness, malingering          Problems Addressed:  Acute psychosis: acute illness or injury that poses a threat to life or bodily functions  Bipolar 1 disorder: chronic illness or injury with exacerbation, progression, or side effects of treatment  Medical clearance for psychiatric admission: acute illness or injury that poses a threat to life or bodily functions  Schizophrenia, unspecified type: chronic illness or injury with exacerbation, progression, or side effects of treatment    Amount and/or Complexity of Data Reviewed  Labs: ordered.    Risk  Prescription drug management.  Diagnosis or treatment significantly limited by social determinants of health.            Scribe Attestation:   Scribe #1: I performed the above scribed service and the documentation accurately describes the services I performed. I attest to the accuracy of the note.    Attending Attestation:           Physician Attestation for Scribe:  Physician Attestation Statement for Scribe #1: I, Adelso Petersen IV, MD, reviewed documentation, as scribed by Gina Perkins in my presence, and it is both accurate and complete.             ED Course as of 02/18/24 2134   Sun Feb 18, 2024   1742 Patient PEC'd [ED]   1747 43-year-old male history of schizophrenia bipolar disorder presenting with racing thoughts paranoia visual and auditory hallucinations denies SI HI at this time reports his medicines were recently decreased in dosage reports that he tried to go voluntarily to a psych facility but they were full and declined him.  Exam as above patient is clearly a danger to himself others there was unable  to obtain voluntary admission today so will file PEC  [AC]   2035 Still tachycardic, will give additional fluids, repeat BMP [AC]   2130 Repeat i-STAT Chem improved tachycardia improved UDS positive for amphetamines I suspect this is the cause of his mild tachycardia.  Medically cleared for transfer to a psychiatric facility at this time [AC]      ED Course User Index  [AC] Adelso Petersen IV, MD  [ED] Gina Perkins       Medically cleared for psychiatry placement: 2/18/2024  9:31 PM                   Clinical Impression:  Final diagnoses:  [Z00.8] Medical clearance for psychiatric admission (Primary)  [F23] Acute psychosis  [F20.9] Schizophrenia, unspecified type  [F31.9] Bipolar 1 disorder          ED Disposition Condition    Transfer to Psych Facility Stable          ED Prescriptions    None       Follow-up Information       Follow up With Specialties Details Why Contact Info    [facility] - Emergency Dept Emergency Medicine Go to  If symptoms worsen [address] [facility] [id number] [phone]    Primary care physician  Schedule an appointment as soon as possible for a visit   Follow up with you primary care physician.   If you do not have a primary care physician call [phone] to schedule an appointment.             Adelso Petersen IV, MD  02/18/24 2134

## 2024-02-18 NOTE — FIRST PROVIDER EVALUATION
Medical screening examination initiated.  I have conducted a focused provider triage encounter, findings are as follows:    Brief history of present illness:  42y/o M presents to the ED with psych evaluation. Denies any SI/HI.     There were no vitals filed for this visit.    Pertinent physical exam:  AAA x 3    Brief workup plan:  MD Evaluation    Preliminary workup initiated; this workup will be continued and followed by the physician or advanced practice provider that is assigned to the patient when roomed.

## 2024-02-19 VITALS
RESPIRATION RATE: 20 BRPM | TEMPERATURE: 98 F | HEART RATE: 92 BPM | BODY MASS INDEX: 27.5 KG/M2 | OXYGEN SATURATION: 98 % | DIASTOLIC BLOOD PRESSURE: 70 MMHG | WEIGHT: 203 LBS | HEIGHT: 72 IN | SYSTOLIC BLOOD PRESSURE: 134 MMHG

## 2024-02-19 PROCEDURE — 25000003 PHARM REV CODE 250: Performed by: STUDENT IN AN ORGANIZED HEALTH CARE EDUCATION/TRAINING PROGRAM

## 2024-02-19 RX ORDER — HALOPERIDOL 5 MG/1
5 TABLET ORAL
Status: COMPLETED | OUTPATIENT
Start: 2024-02-19 | End: 2024-02-19

## 2024-02-19 RX ADMIN — HALOPERIDOL 5 MG: 5 TABLET ORAL at 12:02

## 2024-04-29 PROBLEM — S90.822A BLISTER OF LEFT FOOT: Status: ACTIVE | Noted: 2024-04-29

## 2024-04-29 PROBLEM — F19.10 SUBSTANCE ABUSE: Status: ACTIVE | Noted: 2024-04-29

## 2024-04-29 PROBLEM — B35.1 ONYCHOMYCOSIS: Status: ACTIVE | Noted: 2024-04-29

## 2024-05-19 PROBLEM — R45.851 DEPRESSION WITH SUICIDAL IDEATION: Status: ACTIVE | Noted: 2024-05-19

## 2024-05-19 PROBLEM — F32.A DEPRESSION WITH SUICIDAL IDEATION: Status: ACTIVE | Noted: 2024-05-19

## 2024-05-19 PROBLEM — F32.3 MAJOR DEPRESSION WITH PSYCHOTIC FEATURES: Status: ACTIVE | Noted: 2024-05-19

## 2024-08-19 ENCOUNTER — HOSPITAL ENCOUNTER (EMERGENCY)
Facility: HOSPITAL | Age: 44
Discharge: LEFT BEFORE DISCHARGE | End: 2024-08-19
Attending: EMERGENCY MEDICINE
Payer: MEDICAID

## 2024-08-19 VITALS
HEART RATE: 104 BPM | OXYGEN SATURATION: 95 % | DIASTOLIC BLOOD PRESSURE: 75 MMHG | RESPIRATION RATE: 20 BRPM | TEMPERATURE: 99 F | SYSTOLIC BLOOD PRESSURE: 117 MMHG

## 2024-08-19 DIAGNOSIS — Z71.51 ENCOUNTER FOR DRUG REHABILITATION: Primary | ICD-10-CM

## 2024-08-19 LAB
AMPHET UR QL SCN: POSITIVE
BARBITURATE SCN PRESENT UR: NEGATIVE
BENZODIAZ UR QL SCN: NEGATIVE
CANNABINOIDS UR QL SCN: NEGATIVE
COCAINE UR QL SCN: NEGATIVE
ETHANOL SERPL-MCNC: <10 MG/DL
FENTANYL UR QL SCN: NEGATIVE
HOLD SPECIMEN: NORMAL
MDMA UR QL SCN: NEGATIVE
OPIATES UR QL SCN: NEGATIVE
PCP UR QL: NEGATIVE
PH UR: 5.5 [PH] (ref 3–11)
SPECIFIC GRAVITY, URINE AUTO (.000) (OHS): 1 (ref 1–1.03)

## 2024-08-19 PROCEDURE — 80307 DRUG TEST PRSMV CHEM ANLYZR: CPT | Performed by: EMERGENCY MEDICINE

## 2024-08-19 PROCEDURE — 82077 ASSAY SPEC XCP UR&BREATH IA: CPT | Performed by: EMERGENCY MEDICINE

## 2024-08-19 PROCEDURE — 99283 EMERGENCY DEPT VISIT LOW MDM: CPT

## 2024-08-19 NOTE — ED PROVIDER NOTES
"ED PROVIDER NOTE  8/19/2024    CHIEF COMPLAINT:   Chief Complaint   Patient presents with    Addiction Problem     Reports relapse on meth last night. On suboxone. Denies SI.        HISTORY OF PRESENT ILLNESS:   Chance Lainez is a 43 y.o. male who presents with chief complaint Wants detox. Reports that he relapsed last night on methamphetamines and wants drug rehab placement. Reports feeling depressed due to relapsing but denies suicidal ideation.    The history is provided by the patient.         REVIEW OF SYSTEMS: as noted in the HPI.  NURSING NOTES REVIEWED      PAST MEDICAL/SURGICAL HISTORY:   Past Medical History:   Diagnosis Date    Bipolar disorder, unspecified     History of psychiatric hospitalization     Schizophrenia, unspecified     Seizures     History reviewed. No pertinent surgical history.    FAMILY HISTORY:   Family History   Problem Relation Name Age of Onset    No Known Problems Mother      No Known Problems Father         SOCIAL HISTORY:   Social History     Tobacco Use    Smoking status: Every Day     Current packs/day: 1.00     Average packs/day: 1 pack/day for 25.6 years (25.6 ttl pk-yrs)     Types: Cigarettes     Start date: 1999    Smokeless tobacco: Never   Substance Use Topics    Alcohol use: Yes     Comment: Vodkka daily    Drug use: Yes     Types: Methamphetamines, Fentanyl     Comment: "KRATOM"       ALLERGIES: Review of patient's allergies indicates:  No Known Allergies    PHYSICAL EXAM:  Initial Vitals [08/19/24 0826]   BP Pulse Resp Temp SpO2   117/75 104 20 98.5 °F (36.9 °C) 95 %      MAP       --         Physical Exam    Nursing note and vitals reviewed.  Constitutional: He appears well-developed and well-nourished. No distress.   HENT:   Head: Normocephalic and atraumatic.   Nose: Nose normal.   Mouth/Throat: Oropharynx is clear and moist and mucous membranes are normal.   Eyes: Conjunctivae and EOM are normal. Pupils are equal, round, and reactive to light.   Neck: Neck supple. " No tracheal deviation present.   Cardiovascular:  Normal rate, regular rhythm, normal heart sounds, intact distal pulses and normal pulses.           Pulmonary/Chest: Effort normal and breath sounds normal. No respiratory distress.   Abdominal: Abdomen is soft. There is no abdominal tenderness. There is no rebound and no guarding.   Musculoskeletal:         General: Normal range of motion.      Cervical back: Neck supple.     Neurological: He is alert and oriented to person, place, and time. GCS eye subscore is 4. GCS verbal subscore is 5. GCS motor subscore is 6.   CN II-XII intact. Moves all extremities. No gross sensory or motor deficits.   Skin: Skin is warm, dry and intact.   Psychiatric: His speech is normal and behavior is normal. Judgment and thought content normal. Cognition and memory are normal. He exhibits a depressed mood. He expresses no homicidal and no suicidal ideation.   Reports feeling depressed because he relapsed.          RESULTS:  Labs Reviewed   DRUG SCREEN, URINE (BEAKER) - Abnormal       Result Value    Amphetamines, Urine Positive (*)     Barbiturates, Urine Negative      Benzodiazepine, Urine Negative      Cannabinoids, Urine Negative      Cocaine, Urine Negative      Fentanyl, Urine Negative      MDMA, Urine Negative      Opiates, Urine Negative      Phencyclidine, Urine Negative      pH, Urine 5.5      Specific Gravity, Urine Auto 1.005      Narrative:     Cut off concentrations:    Amphetamines - 1000 ng/ml  Barbiturates - 200 ng/ml  Benzodiazepine - 200 ng/ml  Cannabinoids (THC) - 50 ng/ml  Cocaine - 300 ng/ml  Fentanyl - 1.0 ng/ml  MDMA - 500 ng/ml  Opiates - 300 ng/ml   Phencyclidine (PCP) - 25 ng/ml    Specimen submitted for drug analysis and tested for pH and specific gravity in order to evaluate sample integrity. Suspect tampering if specific gravity is <1.003 and/or pH is not within the range of 4.5 - 8.0  False negatives may result form substances such as bleach added to  urine.  False positives may result for the presence of a substance with similar chemical structure to the drug or its metabolite.    This test provides only a PRELIMINARY analytical test result. A more specific alternate chemical method must be used in order to obtain a confirmed analytical result. Gas chromatography/mass spectrometry (GC/MS) is the preferred confirmatory method. Other chemical confirmation methods are available. Clinical consideration and professional judgement should be applied to any drug of abuse test result, particularly when preliminary positive results are used.    Positive results will be confirmed only at the physicians request. Unconfirmed screening results are to be used only for medical purposes (treatment).        ALCOHOL,MEDICAL (ETHANOL) - Normal    Ethanol Level <10.0     EXTRA TUBES    Narrative:     The following orders were created for panel order EXTRA TUBES.  Procedure                               Abnormality         Status                     ---------                               -----------         ------                     Light Blue Top Hold[[id number]]                             In process                 Lavender Top Hold[[id number]]                               In process                 Gold Top Hold[[id number]]                                   In process                   Please view results for these tests on the individual orders.   LIGHT BLUE TOP HOLD   LAVENDER TOP HOLD   GOLD TOP HOLD     Imaging Results    None         PROCEDURES:  Procedures    ECG:       ED COURSE AND MEDICAL DECISION MAKING:  Medications - No data to display  ED Course as of 08/19/24 0939   Mon Aug 19, 2024   0859 Amphetamines, Urine(!): Positive [IB]   0920 Alcohol, Serum: <10.0 [IB]      ED Course User Index  [IB] Michael Guillen, DO        Medical Decision Making  42 yo [facility].    Amount and/or Complexity of Data Reviewed  Labs: ordered. Decision-making details documented in ED Course.  Discussion of management or test interpretation with external provider(s): [place] Liaison has seen and evaluated the patient and has gotten him placed in [place].        CLINICAL IMPRESSION:  1. Encounter for drug rehabilitation        DISPOSITION:   ED Disposition Condition    Transfer to Another Facility Michael Chowdary DO  08/19/24 0965

## 2025-03-22 ENCOUNTER — HOSPITAL ENCOUNTER (EMERGENCY)
Facility: HOSPITAL | Age: 45
Discharge: PSYCHIATRIC OR SUBSTANCE USE FACILITY | End: 2025-03-22
Attending: INTERNAL MEDICINE
Payer: MEDICAID

## 2025-03-22 ENCOUNTER — HOSPITAL ENCOUNTER (INPATIENT)
Facility: HOSPITAL | Age: 45
LOS: 5 days | Discharge: HOME | DRG: 885 | End: 2025-03-27
Attending: PSYCHIATRY & NEUROLOGY | Admitting: PSYCHIATRY & NEUROLOGY
Payer: MEDICAID

## 2025-03-22 VITALS
RESPIRATION RATE: 16 BRPM | HEIGHT: 72 IN | DIASTOLIC BLOOD PRESSURE: 92 MMHG | BODY MASS INDEX: 28.44 KG/M2 | OXYGEN SATURATION: 95 % | TEMPERATURE: 99 F | WEIGHT: 210 LBS | SYSTOLIC BLOOD PRESSURE: 165 MMHG | HEART RATE: 102 BPM

## 2025-03-22 DIAGNOSIS — F19.90 SUBSTANCE USE DISORDER: ICD-10-CM

## 2025-03-22 DIAGNOSIS — F20.0 PARANOID SCHIZOPHRENIA: Primary | ICD-10-CM

## 2025-03-22 DIAGNOSIS — F29 PSYCHOSIS: ICD-10-CM

## 2025-03-22 DIAGNOSIS — F23 ACUTE PSYCHOSIS: ICD-10-CM

## 2025-03-22 LAB
ALBUMIN SERPL-MCNC: 5 G/DL (ref 3.5–5)
ALBUMIN/GLOB SERPL: 1.4 RATIO (ref 1.1–2)
ALP SERPL-CCNC: 75 UNIT/L (ref 40–150)
ALT SERPL-CCNC: 15 UNIT/L (ref 0–55)
ANION GAP SERPL CALC-SCNC: 12 MEQ/L
APAP SERPL-MCNC: <3 UG/ML (ref 10–30)
AST SERPL-CCNC: 17 UNIT/L (ref 11–45)
BASOPHILS # BLD AUTO: 0.02 X10(3)/MCL
BASOPHILS NFR BLD AUTO: 0.2 %
BILIRUB SERPL-MCNC: 0.9 MG/DL
BUN SERPL-MCNC: 18 MG/DL (ref 8.9–20.6)
CALCIUM SERPL-MCNC: 9.4 MG/DL (ref 8.4–10.2)
CHLORIDE SERPL-SCNC: 108 MMOL/L (ref 98–107)
CO2 SERPL-SCNC: 22 MMOL/L (ref 22–29)
CREAT SERPL-MCNC: 0.95 MG/DL (ref 0.72–1.25)
CREAT/UREA NIT SERPL: 19
EOSINOPHIL # BLD AUTO: 0 X10(3)/MCL (ref 0–0.9)
EOSINOPHIL NFR BLD AUTO: 0 %
ERYTHROCYTE [DISTWIDTH] IN BLOOD BY AUTOMATED COUNT: 12.6 % (ref 11.5–17)
ETHANOL SERPL-MCNC: 11 MG/DL
GFR SERPLBLD CREATININE-BSD FMLA CKD-EPI: >60 ML/MIN/1.73/M2
GLOBULIN SER-MCNC: 3.6 GM/DL (ref 2.4–3.5)
GLUCOSE SERPL-MCNC: 106 MG/DL (ref 74–100)
HCT VFR BLD AUTO: 40.4 % (ref 42–52)
HGB BLD-MCNC: 14 G/DL (ref 14–18)
IMM GRANULOCYTES # BLD AUTO: 0.03 X10(3)/MCL (ref 0–0.04)
IMM GRANULOCYTES NFR BLD AUTO: 0.3 %
LYMPHOCYTES # BLD AUTO: 1.38 X10(3)/MCL (ref 0.6–4.6)
LYMPHOCYTES NFR BLD AUTO: 12.2 %
MCH RBC QN AUTO: 29.8 PG (ref 27–31)
MCHC RBC AUTO-ENTMCNC: 34.7 G/DL (ref 33–36)
MCV RBC AUTO: 86 FL (ref 80–94)
MONOCYTES # BLD AUTO: 0.51 X10(3)/MCL (ref 0.1–1.3)
MONOCYTES NFR BLD AUTO: 4.5 %
NEUTROPHILS # BLD AUTO: 9.4 X10(3)/MCL (ref 2.1–9.2)
NEUTROPHILS NFR BLD AUTO: 82.8 %
NRBC BLD AUTO-RTO: 0 %
PLATELET # BLD AUTO: 300 X10(3)/MCL (ref 130–400)
PMV BLD AUTO: 10.6 FL (ref 7.4–10.4)
POTASSIUM SERPL-SCNC: 4 MMOL/L (ref 3.5–5.1)
PROT SERPL-MCNC: 8.6 GM/DL (ref 6.4–8.3)
RBC # BLD AUTO: 4.7 X10(6)/MCL (ref 4.7–6.1)
SODIUM SERPL-SCNC: 142 MMOL/L (ref 136–145)
WBC # BLD AUTO: 11.34 X10(3)/MCL (ref 4.5–11.5)

## 2025-03-22 PROCEDURE — 99285 EMERGENCY DEPT VISIT HI MDM: CPT | Mod: 25

## 2025-03-22 PROCEDURE — 96372 THER/PROPH/DIAG INJ SC/IM: CPT | Performed by: INTERNAL MEDICINE

## 2025-03-22 PROCEDURE — 80143 DRUG ASSAY ACETAMINOPHEN: CPT | Performed by: INTERNAL MEDICINE

## 2025-03-22 PROCEDURE — 63600175 PHARM REV CODE 636 W HCPCS: Performed by: INTERNAL MEDICINE

## 2025-03-22 PROCEDURE — 25000003 PHARM REV CODE 250: Performed by: PSYCHIATRY & NEUROLOGY

## 2025-03-22 PROCEDURE — 82077 ASSAY SPEC XCP UR&BREATH IA: CPT | Performed by: INTERNAL MEDICINE

## 2025-03-22 PROCEDURE — 85025 COMPLETE CBC W/AUTO DIFF WBC: CPT | Performed by: INTERNAL MEDICINE

## 2025-03-22 PROCEDURE — 12400001 HC PSYCH SEMI-PRIVATE ROOM

## 2025-03-22 PROCEDURE — 80053 COMPREHEN METABOLIC PANEL: CPT | Performed by: INTERNAL MEDICINE

## 2025-03-22 RX ORDER — HALOPERIDOL 5 MG/1
5 TABLET ORAL EVERY 6 HOURS PRN
Status: DISCONTINUED | OUTPATIENT
Start: 2025-03-22 | End: 2025-03-27 | Stop reason: HOSPADM

## 2025-03-22 RX ORDER — IBUPROFEN 200 MG
1 TABLET ORAL DAILY
Status: DISCONTINUED | OUTPATIENT
Start: 2025-03-23 | End: 2025-03-27 | Stop reason: HOSPADM

## 2025-03-22 RX ORDER — ADHESIVE BANDAGE
30 BANDAGE TOPICAL DAILY PRN
Status: DISCONTINUED | OUTPATIENT
Start: 2025-03-22 | End: 2025-03-27 | Stop reason: HOSPADM

## 2025-03-22 RX ORDER — HALOPERIDOL DECANOATE 100 MG/ML
100 INJECTION INTRAMUSCULAR
Status: COMPLETED | OUTPATIENT
Start: 2025-03-22 | End: 2025-03-22

## 2025-03-22 RX ORDER — ONDANSETRON 4 MG/1
4 TABLET, ORALLY DISINTEGRATING ORAL EVERY 6 HOURS PRN
Status: DISCONTINUED | OUTPATIENT
Start: 2025-03-22 | End: 2025-03-27 | Stop reason: HOSPADM

## 2025-03-22 RX ORDER — TRAZODONE HYDROCHLORIDE 100 MG/1
100 TABLET ORAL NIGHTLY PRN
Status: DISCONTINUED | OUTPATIENT
Start: 2025-03-22 | End: 2025-03-27 | Stop reason: HOSPADM

## 2025-03-22 RX ORDER — HALOPERIDOL LACTATE 5 MG/ML
5 INJECTION, SOLUTION INTRAMUSCULAR EVERY 6 HOURS PRN
Status: DISCONTINUED | OUTPATIENT
Start: 2025-03-22 | End: 2025-03-27 | Stop reason: HOSPADM

## 2025-03-22 RX ORDER — HYDROXYZINE HYDROCHLORIDE 50 MG/1
50 TABLET, FILM COATED ORAL EVERY 4 HOURS PRN
Status: DISCONTINUED | OUTPATIENT
Start: 2025-03-22 | End: 2025-03-27 | Stop reason: HOSPADM

## 2025-03-22 RX ORDER — LORAZEPAM 2 MG/ML
2 INJECTION INTRAMUSCULAR EVERY 6 HOURS PRN
Status: DISCONTINUED | OUTPATIENT
Start: 2025-03-22 | End: 2025-03-27 | Stop reason: HOSPADM

## 2025-03-22 RX ORDER — DIPHENHYDRAMINE HYDROCHLORIDE 50 MG/ML
50 INJECTION, SOLUTION INTRAMUSCULAR; INTRAVENOUS
Status: COMPLETED | OUTPATIENT
Start: 2025-03-22 | End: 2025-03-22

## 2025-03-22 RX ORDER — ACETAMINOPHEN 325 MG/1
650 TABLET ORAL EVERY 6 HOURS PRN
Status: DISCONTINUED | OUTPATIENT
Start: 2025-03-22 | End: 2025-03-27 | Stop reason: HOSPADM

## 2025-03-22 RX ORDER — ALUMINUM HYDROXIDE, MAGNESIUM HYDROXIDE, AND SIMETHICONE 1200; 120; 1200 MG/30ML; MG/30ML; MG/30ML
30 SUSPENSION ORAL EVERY 6 HOURS PRN
Status: DISCONTINUED | OUTPATIENT
Start: 2025-03-22 | End: 2025-03-27 | Stop reason: HOSPADM

## 2025-03-22 RX ORDER — DIPHENHYDRAMINE HYDROCHLORIDE 50 MG/ML
50 INJECTION, SOLUTION INTRAMUSCULAR; INTRAVENOUS EVERY 6 HOURS PRN
Status: DISCONTINUED | OUTPATIENT
Start: 2025-03-22 | End: 2025-03-27 | Stop reason: HOSPADM

## 2025-03-22 RX ORDER — LORAZEPAM 1 MG/1
2 TABLET ORAL EVERY 6 HOURS PRN
Status: DISCONTINUED | OUTPATIENT
Start: 2025-03-22 | End: 2025-03-27 | Stop reason: HOSPADM

## 2025-03-22 RX ORDER — DIPHENHYDRAMINE HCL 25 MG
50 CAPSULE ORAL EVERY 6 HOURS PRN
Status: DISCONTINUED | OUTPATIENT
Start: 2025-03-22 | End: 2025-03-27 | Stop reason: HOSPADM

## 2025-03-22 RX ADMIN — LORAZEPAM 2 MG: 1 TABLET ORAL at 07:03

## 2025-03-22 RX ADMIN — HALOPERIDOL DECANOATE 100 MG: 100 INJECTION INTRAMUSCULAR at 10:03

## 2025-03-22 RX ADMIN — DIPHENHYDRAMINE HYDROCHLORIDE 50 MG: 50 INJECTION, SOLUTION INTRAMUSCULAR; INTRAVENOUS at 10:03

## 2025-03-22 RX ADMIN — DIPHENHYDRAMINE HYDROCHLORIDE 50 MG: 25 CAPSULE ORAL at 07:03

## 2025-03-22 RX ADMIN — HALOPERIDOL 5 MG: 5 TABLET ORAL at 07:03

## 2025-03-22 NOTE — ED PROVIDER NOTES
Date: 3/22/2025  Time of Note: 10:22 AM   Source of History:  History obtained from the patient.  Medics  Chief complaint:  Mental Health Problem (Pt found sitting in his car parked in the middle of the roadway since 0500 this morning. Pt also admitted to EMS that he used Kratom & ETOH recently. Pt requested mental health eval, denies SI/HI. [facility] was on scene. Pt's mother reported to EMS that pt has hx of schizophrenia, paranoia and states that he always feels people are after him. Pt repeatedly doing the sign of the cross, calm & cooperative at this time.)      HPI:  Chance Lainez is a 44 y.o. year old male with history of  has a past medical history of Bipolar disorder, unspecified, History of psychiatric hospitalization, Schizophrenia, unspecified, and Seizures. presenting with Mental Health Problem (Pt found sitting in his car parked in the middle of the roadway since 0500 this morning. Pt also admitted to EMS that he used Kratom & ETOH recently. Pt requested mental health eval, denies SI/HI. [facility] was on scene. Pt's mother reported to EMS that pt has hx of schizophrenia, paranoia and states that he always feels people are after him. Pt repeatedly doing the sign of the cross, calm & cooperative at this time.)    Review of Systems:    As per HPI and below:  Constitutional: No Fever.  No Chills.  Eyes: No Visual Changes.  ENT: None voiced by patient.    Cardiovascular: No Chest Pain.  Respiratory: No Shortness of breath. No Cough  GastrointestinaI: No Abdominal Pain.  No Nausea No Vomiting. No Diarrhea, No Constipation.  Genitourinary: No Dysuria  Neurologic: No Headache. No New Focal Weakness.  Musculoskeletal: No Back Pain.  Psychiatric:  He says that he just did some alcohol and kratom, feels anxious, restless, feels that is something wrong, can not explain exactly what is bothering him    Review of patient's allergies indicates:  No Known Allergies    Current Outpatient Medications   Medication Instructions     folic acid (FOLVITE) 1 mg, Oral, Daily    hydrOXYzine pamoate (VISTARIL) 50 mg, Oral, 3 times daily PRN    levETIRAcetam (KEPPRA) 500 mg, Oral, 2 times daily    nicotine (polacrilex) (NICORETTE) 2 mg, Oral, Every hour PRN    paliperidone (INVEGA) 3 mg, Oral, Daily    sertraline (ZOLOFT) 50 mg, Oral, Daily    thiamine 100 mg, Oral, Daily       Past Medical History:   Diagnosis Date    Bipolar disorder, unspecified     History of psychiatric hospitalization     Schizophrenia, unspecified     Seizures        History reviewed. No pertinent surgical history.    Social History[1]    Family History   Problem Relation Name Age of Onset    No Known Problems Mother      No Known Problems Father          Patient Active Problem List    Diagnosis Date Noted    Depression with suicidal ideation 05/19/2024    Major depression with psychotic features 05/19/2024    Substance abuse 04/29/2024    Blister of left foot 04/29/2024    Onychomycosis 04/29/2024    Severe recurrent major depression with psychotic features 12/12/2023    Nicotine use disorder 12/12/2023    Seizure disorder 12/12/2023    Transaminitis 12/12/2023    Substance induced mood disorder 12/11/2023    Opioid use disorder 12/11/2023    Cocaine use disorder 12/11/2023    Amphetamine use disorder, severe 12/11/2023    Alcohol use disorder, severe, dependence 12/11/2023       Physical Exam:    Vitals:    03/22/25 1009   BP: (!) 151/78   Pulse: 93   Resp: 16   Temp: 98.4 °F (36.9 °C)       Nursing note and vital signs reviewed.    Constitutional: No acute distress.  Nontoxic  Eyes: No conjunctival injection.  Extraocular muscles are intact.  ENT: Oropharynx clear.    Cardiovascular: Regular rate and rhythm.  No murmurs.   Respiratory: No Respiratory Distress. Good Bilateral air movement.  No rhonchi. No rales.  Abdomen: Soft.  Not distended.  Nontender.  No guarding.  No rebound. Bowel Sounds Normal.  Musculoskeletal: Good range of motion all joints.  No Gross  deformities Noted.  Skin: No Obvious Rashes seen.    Neurologic:  Awake and Alert.  Cranial Nerves Grossly intact. No focal neurological deficits.  Psychiatry:  Patient has the flight of ideas, talks in a low voice, he has some word salad, started talking about different things while talking to you, he says ready to be now, he does have some jittery movements,    Labs that have been ordered have been independently reviewed and interpreted by myself.    Reviewed Nurses Notes and Vitals.  Labs ordered AND reviewed interpreted independently.  Medical Decision Making    Chance Lainez is 44 y.o. male who  has a past medical history of Bipolar disorder, unspecified, History of psychiatric hospitalization, Schizophrenia, unspecified, and Seizures. arrives in ER with c/o Mental Health Problem (Pt found sitting in his car parked in the middle of the roadway since 0500 this morning. Pt also admitted to EMS that he used Kratom & ETOH recently. Pt requested mental health eval, denies SI/HI. APS[facility] was on scene. Pt's mother reported to EMS that pt has hx of schizophrenia, paranoia and states that he always feels people are after him. Pt repeatedly doing the sign of the cross, calm & cooperative at this time.)    Patient says he did some kratom and alcohol, and then he was found parked in the center of the road since 5:00 a.m. in this morning, police called the ambulance to send him to the emergency room almost at 10 in the morning.  Has a history of schizophrenia, paranoia, substance use disorder,    When I talked to him he is cooperative, talks in a low voice, it started talking about different things can not continue the same conversation.  He does have some jittery movements, jerky movements, I am going to do a psych workup on him, and I will essentially have to send him to the psych facility for evaluation by psychiatrist.        Amount and/or Complexity of Data Reviewed  Labs: ordered.    Risk  Prescription drug  management.       Orders Placed This Encounter   Procedures    CBC auto differential    Comprehensive metabolic panel    Urinalysis, Reflex to Urine Culture    Drug Screen panel, emergency    Ethanol    Acetaminophen level    CBC with Differential    Diet Adult Regular Safety Tray    Vital signs    Undress patient and allow them to wear facility provided apparel.    Direct Psych Observation    PEC/Psych Hold - Physicians Emergency Certificate / 72 Hour Psych Hold     Medications   diphenhydrAMINE injection 50 mg (50 mg Intramuscular Given 3/22/25 1036)   haloperidol decanoate injection 100 mg (100 mg Intramuscular Given 3/22/25 1036)     Admission on 03/22/2025   Component Date Value Ref Range Status    Sodium 03/22/2025 142  136 - 145 mmol/L Final    Potassium 03/22/2025 4.0  3.5 - 5.1 mmol/L Final    Chloride 03/22/2025 108 (H)  98 - 107 mmol/L Final    CO2 03/22/2025 22  22 - 29 mmol/L Final    Glucose 03/22/2025 106 (H)  74 - 100 mg/dL Final    Blood Urea Nitrogen 03/22/2025 18.0  8.9 - 20.6 mg/dL Final    Creatinine 03/22/2025 0.95  0.72 - 1.25 mg/dL Final    Calcium 03/22/2025 9.4  8.4 - 10.2 mg/dL Final    Protein Total 03/22/2025 8.6 (H)  6.4 - 8.3 gm/dL Final    Albumin 03/22/2025 5.0  3.5 - 5.0 g/dL Final    Globulin 03/22/2025 3.6 (H)  2.4 - 3.5 gm/dL Final    Albumin/Globulin Ratio 03/22/2025 1.4  1.1 - 2.0 ratio Final    Bilirubin Total 03/22/2025 0.9  <=1.5 mg/dL Final    ALP 03/22/2025 75  40 - 150 unit/L Final    ALT 03/22/2025 15  0 - 55 unit/L Final    AST 03/22/2025 17  11 - 45 unit/L Final    eGFR 03/22/2025 >60  mL/min/1.73/m2 Final    Anion Gap 03/22/2025 12.0  mEq/L Final    BUN/Creatinine Ratio 03/22/2025 19   Final    Ethanol Level 03/22/2025 11.0 (H)  <=10.0 mg/dL Final    Acetaminophen Level 03/22/2025 <3.0 (L)  10.0 - 30.0 ug/ml Final    WBC 03/22/2025 11.34  4.50 - 11.50 x10(3)/mcL Final    RBC 03/22/2025 4.70  4.70 - 6.10 x10(6)/mcL Final    Hgb 03/22/2025 14.0  14.0 - 18.0 g/dL Final     Hct 03/22/2025 40.4 (L)  42.0 - 52.0 % Final    MCV 03/22/2025 86.0  80.0 - 94.0 fL Final    MCH 03/22/2025 29.8  27.0 - 31.0 pg Final    MCHC 03/22/2025 34.7  33.0 - 36.0 g/dL Final    RDW 03/22/2025 12.6  11.5 - 17.0 % Final    Platelet 03/22/2025 300  130 - 400 x10(3)/mcL Final    MPV 03/22/2025 10.6 (H)  7.4 - 10.4 fL Final    Neut % 03/22/2025 82.8  % Final    Lymph % 03/22/2025 12.2  % Final    Mono % 03/22/2025 4.5  % Final    Eos % 03/22/2025 0.0  % Final    Basophil % 03/22/2025 0.2  % Final    Imm Grans % 03/22/2025 0.3  % Final    Neut # 03/22/2025 9.40 (H)  2.1 - 9.2 x10(3)/mcL Final    Lymph # 03/22/2025 1.38  0.6 - 4.6 x10(3)/mcL Final    Mono # 03/22/2025 0.51  0.1 - 1.3 x10(3)/mcL Final    Eos # 03/22/2025 0.00  0 - 0.9 x10(3)/mcL Final    Baso # 03/22/2025 0.02  <=0.2 x10(3)/mcL Final    Imm Gran # 03/22/2025 0.03  0.00 - 0.04 x10(3)/mcL Final    NRBC% 03/22/2025 0.0  % Final       No orders to display            Psych Clearance:    At the time of my examination, patient does not appear to have any major medical condition which needs to be addressed by admission to hospital and appears to be in medically optimal condition to undergo a psychiatric evaluation and treatment as needed in a psych facility, Patient will be in a monitored facility and they can always bring back to our ER or the nearest ER for reevaluation in case develops any other symptoms.  Medically cleared for psychiatry placement: 3/22/2025 11:02 AM  Medically cleared for psychiatry placement: 3/22/2025 11:02 AM          Diagnostic Impression:      ICD-10-CM ICD-9-CM   1. Paranoid schizophrenia  F20.0 295.30   2. Acute psychosis  F23 298.9   3. Substance use disorder  F19.90 305.90        Medication List        ASK your doctor about these medications      folic acid 1 MG tablet  Commonly known as: FOLVITE  Take 1 tablet (1 mg total) by mouth once daily.     hydrOXYzine pamoate 50 MG Cap  Commonly known as: VISTARIL  Take 1 capsule  (50 mg total) by mouth 3 (three) times daily as needed.     levETIRAcetam 500 MG Tab  Commonly known as: KEPPRA  Take 1 tablet (500 mg total) by mouth 2 (two) times daily.     nicotine (polacrilex) 2 mg Gum  Commonly known as: NICORETTE  Take 1 each (2 mg total) by mouth every hour as needed.     paliperidone 3 MG Tr24  Commonly known as: INVEGA  Take 1 tablet (3 mg total) by mouth once daily.     sertraline 50 MG tablet  Commonly known as: ZOLOFT  Take 1 tablet (50 mg total) by mouth once daily.     thiamine 100 MG tablet  Take 1 tablet (100 mg total) by mouth once daily.             ED Disposition Condition    Transfer to Psych Facility Stable          ED Prescriptions    None       Follow-up Information    None          Medication List        ASK your doctor about these medications      folic acid 1 MG tablet  Commonly known as: FOLVITE  Take 1 tablet (1 mg total) by mouth once daily.     hydrOXYzine pamoate 50 MG Cap  Commonly known as: VISTARIL  Take 1 capsule (50 mg total) by mouth 3 (three) times daily as needed.     levETIRAcetam 500 MG Tab  Commonly known as: KEPPRA  Take 1 tablet (500 mg total) by mouth 2 (two) times daily.     nicotine (polacrilex) 2 mg Gum  Commonly known as: NICORETTE  Take 1 each (2 mg total) by mouth every hour as needed.     paliperidone 3 MG Tr24  Commonly known as: INVEGA  Take 1 tablet (3 mg total) by mouth once daily.     sertraline 50 MG tablet  Commonly known as: ZOLOFT  Take 1 tablet (50 mg total) by mouth once daily.     thiamine 100 MG tablet  Take 1 tablet (100 mg total) by mouth once daily.                 [1]   Social History  Tobacco Use    Smoking status: Every Day     Current packs/day: 1.00     Average packs/day: 1 pack/day for 26.2 years (26.2 ttl pk-yrs)     Types: Cigarettes     Start date: 1999    Smokeless tobacco: Never   Substance Use Topics    Alcohol use: Yes     Comment: Yaya daily    Drug use: Yes     Types: Methamphetamines, Fentanyl     Comment:  ""CHRIS"        Emi Navas MD  03/22/25 5897    "

## 2025-03-22 NOTE — NURSING
"Admission Note:    Chance Lainez is a 44 y.o. male, : 1980, MRN: [id number], admitted on 3/22/2025 to [facility] ([facility]) for Ramirez Stern MD with a diagnosis of Psychosis [F29]. Patient admitted on a status of Physician Emergency Certificate (PEC). Chance reports no known food or drug allergies.    Patient demonstrated an affect that was flat, anxious, irritable, agitated, angry, and  labile. Patient demonstrated mood during assessment that was angry, anxious, and swings. Patient had an appearance that was disheveled.  Patient denies suicidal ideation. Patient denies suicide plan. Patient endorses hallucinations. He is a PEC from [facility] ED. Presents with paranoia and RTIS. Paranoid noted AEB he thinks people are after him. [employer] preoccupation. He admitted to smoking ezzai - how to arabia. Alcohol Level 11. He refused to give a urine sample for UA and UDS ([facility] ED). No order given to in and out cath patient. Robert Louis NP aware and patient accepted without a UA or UDS result.     Chance's  height is 5' 10" (1.778 m) and weight is 99 kg (218 lb 3.2 oz). His oral temperature is 98.8 °F (37.1 °C). His blood pressure is 160/92 (abnormal) and his pulse is 75. His respiration is 20 and oxygen saturation is 95%.     Chance's last BM was noted on: 3/21/25.    Metal detector screening performed via security personnel. The result of the scan was no contraband found. Head-to-toe physical assessment completed with the following findings: no wounds found upon body screen. A full skin assessment was performed. Debbies skin appeared warm, dry, and intact.  Chance was oriented to unit, staff, peers, and room. Patient belongings/valuables stored in locked intake room cabinet and changes of clothing provided to patient. Chance was placed on Q 15 min observations.       "

## 2025-03-22 NOTE — PLAN OF CARE
Problem: Adult Behavioral Health Plan of Care  Goal: Plan of Care Review  Outcome: Not Progressing  Flowsheets (Taken 3/22/2025 1704)  Patient Agreement with Plan of Care: unable to participate  Plan of Care Reviewed With: patient  Goal: Patient-Specific Goal (Individualization)  Outcome: Not Progressing  Flowsheets (Taken 3/22/2025 1704)  Patient Personal Strengths: independent living skills  Patient Vulnerabilities: substance abuse/addiction  Goal: Adheres to Safety Considerations for Self and Others  Outcome: Not Progressing  Flowsheets (Taken 3/22/2025 1704)  Adheres to Safety Considerations for Self and Others: unable to achieve outcome  Intervention: Develop and Maintain Individualized Safety Plan  Flowsheets (Taken 3/22/2025 1704)  Safety Measures: safety rounds completed  Goal: Absence of New-Onset Illness or Injury  Outcome: Not Progressing  Intervention: Identify and Manage Fall Risk  Flowsheets (Taken 3/22/2025 1704)  Safety Measures: safety rounds completed  Intervention: Prevent VTE (Venous Thromboembolism)  Flowsheets (Taken 3/22/2025 1704)  VTE Prevention/Management: fluids promoted  Intervention: Prevent Infection  Flowsheets (Taken 3/22/2025 1704)  Infection Prevention: rest/sleep promoted  Goal: Optimized Coping Skills in Response to Life Stressors  Outcome: Not Progressing  Flowsheets (Taken 3/22/2025 1704)  Optimized Coping Skills in Response to Life Stressors: unable to achieve outcome  Intervention: Promote Effective Coping Strategies  Flowsheets (Taken 3/22/2025 1704)  Supportive Measures: self-care encouraged  Goal: Develops/Participates in Therapeutic [place] to Support Successful Transition  Outcome: Not Progressing  Flowsheets (Taken 3/22/2025 1704)  Develops/Participates in Therapeutic [place] to Support Successful Transition: unable to achieve outcome  Intervention: Foster Therapeutic [place]  Flowsheets (Taken 3/22/2025 1704)  Trust Relationship/Rapport: care explained  Goal:  Rounds/Family Conference  Outcome: Not Progressing  Flowsheets (Taken 3/22/2025 1704)  Participants:   milieu/psych techs   nursing     Problem: Psychotic Signs/Symptoms  Goal: Improved Behavioral Control (Psychotic Signs/Symptoms)  Outcome: Not Progressing  Flowsheets (Taken 3/22/2025 1704)  Mutually Determined Action Steps (Improved Behavioral Control): identifies symptoms triggers  Intervention: Manage Behavior  Flowsheets (Taken 3/22/2025 1704)  De-Escalation Techniques: quiet time facilitated  Goal: Optimal Cognitive Function (Psychotic Signs/Symptoms)  Outcome: Not Progressing  Flowsheets (Taken 3/22/2025 1704)  Mutually Determined Action Steps (Optimal Cognitive Function): participates in attention training  Intervention: Support and Promote Cognitive Ability  Flowsheets (Taken 3/22/2025 1704)  Trust Relationship/Rapport: care explained  Goal: Increased Participation and Engagement (Psychotic Signs/Symptoms)  Outcome: Not Progressing  Flowsheets (Taken 3/22/2025 1704)  Mutually Determined Action Steps (Increased Participation and Engagement): identifies symptoms triggers  Intervention: Facilitate Participation and Engagement  Flowsheets (Taken 3/22/2025 1704)  Supportive Measures: self-care encouraged  Diversional Activity: television  Goal: Improved Mood Symptoms (Psychotic Signs/Symptoms)  Outcome: Not Progressing  Flowsheets (Taken 3/22/2025 1704)  Mutually Determined Action Steps (Improved Mood Symptoms): acknowledges progress  Intervention: Optimize Emotion and Mood  Flowsheets (Taken 3/22/2025 1704)  Supportive Measures: self-care encouraged  Diversional Activity: television  Goal: Improved Psychomotor Symptoms (Psychotic Signs/Symptoms)  Outcome: Not Progressing  Flowsheets (Taken 3/22/2025 1704)  Mutually Determined Action Steps (Improved Psychomotor Symptoms): exhibits decrease in agitation  Intervention: Manage Psychomotor Movement  Flowsheets (Taken 3/22/2025 1704)  Activity (Behavioral Health): up  ad dimitrios  Patient Performed Hygiene: teeth brushed  Diversional Activity: television  Goal: Decreased Sensory Symptoms (Psychotic Signs/Symptoms)  Outcome: Not Progressing  Flowsheets (Taken 3/22/2025 1704)  Mutually Determined Action Steps (Decreased Sensory Symptoms): shares insight re: need for meds  Intervention: Minimize and Manage Sensory Impairment  Flowsheets (Taken 3/22/2025 1704)  Sensory Stimulation Regulation: auditory stimulation minimized  Goal: Improved Sleep (Psychotic Signs/Symptoms)  Outcome: Not Progressing  Flowsheets (Taken 3/22/2025 1704)  Mutually Determined Action Steps (Improved Sleep): sleeps 4-6 hours at night  Intervention: Promote Healthy Sleep Hygiene  Flowsheets (Taken 3/22/2025 1704)  Sleep Hygiene Promotion: regular sleep pattern promoted  Goal: Enhanced Social, Occupational or Functional Skills (Psychotic Signs/Symptoms)  Outcome: Not Progressing  Flowsheets (Taken 3/22/2025 1704)  Mutually Determined Action Steps (Enhanced Social, Occupational or Functional Skills): participates in social skills training  Intervention: Promote Social, Occupational and Functional Ability  Flowsheets (Taken 3/22/2025 1704)  Trust Relationship/Rapport: care explained  Social Functional Ability Promotion: self-expression encouraged     Problem: Excessive Substance Use  Goal: Optimized Energy Level (Excessive Substance Use)  Outcome: Not Progressing  Flowsheets (Taken 3/22/2025 1704)  Mutually Determined Action Steps (Optimized Energy Level): grooms self without prompting  Intervention: Optimize Energy Level  Flowsheets (Taken 3/22/2025 1704)  Activity (Behavioral Health): up ad dimitrios  Patient Performed Hygiene: teeth brushed  Diversional Activity: television  Goal: Improved Behavioral Control (Excessive Substance Use)  Outcome: Not Progressing  Flowsheets (Taken 3/22/2025 1704)  Mutually Determined Action Steps (Improved Behavioral Control): identifies major stressors  Intervention: Promote Behavior and  Impulse Control  Flowsheets (Taken 3/22/2025 1704)  Behavior Management: behavioral plan developed  Goal: Increased Participation and Engagement (Excessive Substance Use)  Outcome: Not Progressing  Flowsheets (Taken 3/22/2025 1704)  Mutually Determined Action Steps (Increased Participation and Engagement): discusses ongoing recovery plan  Intervention: Facilitate Participation and Engagement  Flowsheets (Taken 3/22/2025 1704)  Supportive Measures: self-care encouraged  Diversional Activity: television  Goal: Improved Physiologic Symptoms (Excessive Substance Use)  Outcome: Not Progressing  Flowsheets (Taken 3/22/2025 1704)  Mutually Determined Action Steps (Improved Physiologic Symptoms): discusses use pattern  Intervention: Optimize Physiologic Function  Flowsheets (Taken 3/22/2025 1704)  Oral Nutrition Promotion: rest periods promoted  Nutrition Interventions: food preferences provided  Goal: Enhanced Social, Occupational or Functional Skills (Excessive Substance Use)  Outcome: Not Progressing  Flowsheets (Taken 3/22/2025 1704)  Mutually Determined Action Steps (Enhanced Social, Occupational or Functional Skills): participates in social skills training  Intervention: Promote Social, Occupational and Functional Ability  Flowsheets (Taken 3/22/2025 1704)  Trust Relationship/Rapport: care explained  Social Functional Ability Promotion: self-expression encouraged

## 2025-03-23 PROCEDURE — 25000003 PHARM REV CODE 250: Performed by: NURSE PRACTITIONER

## 2025-03-23 PROCEDURE — S4991 NICOTINE PATCH NONLEGEND: HCPCS | Performed by: PSYCHIATRY & NEUROLOGY

## 2025-03-23 PROCEDURE — 12400001 HC PSYCH SEMI-PRIVATE ROOM

## 2025-03-23 PROCEDURE — 25000003 PHARM REV CODE 250: Performed by: PSYCHIATRY & NEUROLOGY

## 2025-03-23 PROCEDURE — 25000003 PHARM REV CODE 250: Performed by: PEDIATRICS

## 2025-03-23 RX ORDER — CLONIDINE HYDROCHLORIDE 0.1 MG/1
0.1 TABLET ORAL EVERY 8 HOURS PRN
Status: DISCONTINUED | OUTPATIENT
Start: 2025-03-23 | End: 2025-03-27 | Stop reason: HOSPADM

## 2025-03-23 RX ORDER — MUPIROCIN 20 MG/G
OINTMENT TOPICAL 2 TIMES DAILY
Status: DISCONTINUED | OUTPATIENT
Start: 2025-03-23 | End: 2025-03-23

## 2025-03-23 RX ORDER — PALIPERIDONE 3 MG/1
3 TABLET, EXTENDED RELEASE ORAL DAILY
Status: DISCONTINUED | OUTPATIENT
Start: 2025-03-23 | End: 2025-03-26

## 2025-03-23 RX ORDER — CLONIDINE HYDROCHLORIDE 0.1 MG/1
0.2 TABLET ORAL EVERY 8 HOURS PRN
Status: DISCONTINUED | OUTPATIENT
Start: 2025-03-23 | End: 2025-03-27 | Stop reason: HOSPADM

## 2025-03-23 RX ADMIN — ACETAMINOPHEN 650 MG: 325 TABLET, FILM COATED ORAL at 08:03

## 2025-03-23 RX ADMIN — HYDROXYZINE HYDROCHLORIDE 50 MG: 50 TABLET, FILM COATED ORAL at 05:03

## 2025-03-23 RX ADMIN — HALOPERIDOL 5 MG: 5 TABLET ORAL at 07:03

## 2025-03-23 RX ADMIN — CLONIDINE HYDROCHLORIDE 0.1 MG: 0.1 TABLET ORAL at 04:03

## 2025-03-23 RX ADMIN — DIPHENHYDRAMINE HYDROCHLORIDE 50 MG: 25 CAPSULE ORAL at 07:03

## 2025-03-23 RX ADMIN — LORAZEPAM 2 MG: 1 TABLET ORAL at 07:03

## 2025-03-23 RX ADMIN — HYDROXYZINE HYDROCHLORIDE 50 MG: 50 TABLET, FILM COATED ORAL at 09:03

## 2025-03-23 RX ADMIN — PALIPERIDONE 3 MG: 3 TABLET, EXTENDED RELEASE ORAL at 11:03

## 2025-03-23 RX ADMIN — NICOTINE 1 PATCH: 21 PATCH, EXTENDED RELEASE TRANSDERMAL at 08:03

## 2025-03-23 NOTE — H&P
[facility] - Behavioral Health Unit  History & Physical    Subjective:      Chief Complaint/Reason for Admission: florid psychosis     Chance Lainez is a 44 y.o. male. Psychosis     Past Medical History:   Diagnosis Date    Bipolar disorder, unspecified     History of psychiatric hospitalization     Schizophrenia, unspecified     Seizures      No past surgical history on file.  Family History   Problem Relation Name Age of Onset    No Known Problems Mother      No Known Problems Father       Social History[1]    PTA Medications   Medication Sig    folic acid (FOLVITE) 1 MG tablet Take 1 tablet (1 mg total) by mouth once daily.    hydrOXYzine pamoate (VISTARIL) 50 MG Cap Take 1 capsule (50 mg total) by mouth 3 (three) times daily as needed.    levETIRAcetam (KEPPRA) 500 MG Tab Take 1 tablet (500 mg total) by mouth 2 (two) times daily.    nicotine, polacrilex, (NICORETTE) 2 mg Gum Take 1 each (2 mg total) by mouth every hour as needed.    paliperidone (INVEGA) 3 MG TR24 Take 1 tablet (3 mg total) by mouth once daily.    sertraline (ZOLOFT) 50 MG tablet Take 1 tablet (50 mg total) by mouth once daily.    thiamine 100 MG tablet Take 1 tablet (100 mg total) by mouth once daily.     Review of patient's allergies indicates:  No Known Allergies     Review of Systems   Constitutional: Negative.    HENT: Negative.     Eyes: Negative.    Respiratory: Negative.     Cardiovascular: Negative.    Gastrointestinal: Negative.    Genitourinary: Negative.    Musculoskeletal: Negative.    Skin: Negative.    Neurological: Negative.    Endo/Heme/Allergies: Negative.    Psychiatric/Behavioral:  Positive for hallucinations. Negative for depression, substance abuse and suicidal ideas. The patient is nervous/anxious.        Objective:      Vital Signs (Most Recent)  Temp: 98.3 °F (36.8 °C) (03/23/25 1100)  Pulse: 110 (03/23/25 1100)  Resp: 18 (03/23/25 1100)  BP: 125/87 (03/23/25 1100)  SpO2: 96 % (03/23/25 1100)    Vital  Signs Range (Last 24H):  Temp:  [98.3 °F (36.8 °C)-98.8 °F (37.1 °C)]   Pulse:  []   Resp:  [16-20]   BP: (125-165)/(87-92)   SpO2:  [95 %-96 %]     Physical Exam  HENT:      Head: Normocephalic.      Right Ear: Tympanic membrane normal.      Left Ear: Tympanic membrane normal.      Nose: Nose normal.      Mouth/Throat:      Mouth: Mucous membranes are moist.   Eyes:      Extraocular Movements: Extraocular movements intact.      Pupils: Pupils are equal, round, and reactive to light.   Cardiovascular:      Rate and Rhythm: Normal rate and regular rhythm.   Pulmonary:      Effort: Pulmonary effort is normal.   Abdominal:      General: Abdomen is flat.   Musculoskeletal:         General: Normal range of motion.   Skin:     General: Skin is warm.   Neurological:      General: No focal deficit present.      Mental Status: He is alert and oriented to person, place, and time.      Comments: Vision normal   Hearing normal   EOM intact   Face muscles normal  Facial sensation normal   Shrugs shoulders  Tongue midline            Data Review:    Recent Results (from the past 48 hours)   Comprehensive metabolic panel    Collection Time: 03/22/25 11:10 AM   Result Value Ref Range    Sodium 142 136 - 145 mmol/L    Potassium 4.0 3.5 - 5.1 mmol/L    Chloride 108 (H) 98 - 107 mmol/L    CO2 22 22 - 29 mmol/L    Glucose 106 (H) 74 - 100 mg/dL    Blood Urea Nitrogen 18.0 8.9 - 20.6 mg/dL    Creatinine 0.95 0.72 - 1.25 mg/dL    Calcium 9.4 8.4 - 10.2 mg/dL    Protein Total 8.6 (H) 6.4 - 8.3 gm/dL    Albumin 5.0 3.5 - 5.0 g/dL    Globulin 3.6 (H) 2.4 - 3.5 gm/dL    Albumin/Globulin Ratio 1.4 1.1 - 2.0 ratio    Bilirubin Total 0.9 <=1.5 mg/dL    ALP 75 40 - 150 unit/L    ALT 15 0 - 55 unit/L    AST 17 11 - 45 unit/L    eGFR >60 mL/min/1.73/m2    Anion Gap 12.0 mEq/L    BUN/Creatinine Ratio 19    Ethanol    Collection Time: 03/22/25 11:10 AM   Result Value Ref Range    Ethanol Level 11.0 (H) <=10.0 mg/dL   Acetaminophen level     "Collection Time: 03/22/25 11:10 AM   Result Value Ref Range    Acetaminophen Level <3.0 (L) 10.0 - 30.0 ug/ml   CBC with Differential    Collection Time: 03/22/25 11:10 AM   Result Value Ref Range    WBC 11.34 4.50 - 11.50 x10(3)/mcL    RBC 4.70 4.70 - 6.10 x10(6)/mcL    Hgb 14.0 14.0 - 18.0 g/dL    Hct 40.4 (L) 42.0 - 52.0 %    MCV 86.0 80.0 - 94.0 fL    MCH 29.8 27.0 - 31.0 pg    MCHC 34.7 33.0 - 36.0 g/dL    RDW 12.6 11.5 - 17.0 %    Platelet 300 130 - 400 x10(3)/mcL    MPV 10.6 (H) 7.4 - 10.4 fL    Neut % 82.8 %    Lymph % 12.2 %    Mono % 4.5 %    Eos % 0.0 %    Basophil % 0.2 %    Imm Grans % 0.3 %    Neut # 9.40 (H) 2.1 - 9.2 x10(3)/mcL    Lymph # 1.38 0.6 - 4.6 x10(3)/mcL    Mono # 0.51 0.1 - 1.3 x10(3)/mcL    Eos # 0.00 0 - 0.9 x10(3)/mcL    Baso # 0.02 <=0.2 x10(3)/mcL    Imm Gran # 0.03 0.00 - 0.04 x10(3)/mcL    NRBC% 0.0 %        No results found.       Assessment and Plan       Psychosis          [1]   Social History  Tobacco Use    Smoking status: Every Day     Current packs/day: 1.00     Average packs/day: 1 pack/day for 26.2 years (26.2 ttl pk-yrs)     Types: Cigarettes     Start date: 1999    Smokeless tobacco: Never   Substance Use Topics    Alcohol use: Yes     Comment: Vodkka daily    Drug use: Yes     Types: Methamphetamines, Fentanyl     Comment: "KRATOM"     "
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
## 2025-03-23 NOTE — NURSING
At 0954, complaints of moderate anxiety.  Atarax 50 mg., administered.  At 1054, verbalizes decreasing anxiety, mildly anxious.

## 2025-03-23 NOTE — NURSING
At 0849, complaints of headache, PS 6/10.  Tylenol 650 mg., administered.  At 0949, verbalizes decreasing pain, PS 3/10.

## 2025-03-23 NOTE — NURSING
"1920. Pt hiding behind door, holding his shoes in both hands in an "attack" stance. Noted bloodshot, wide eyes and marked visible tremors to hands. Pt guarded and suspicious. Pt roommate voiced fearful concerns. Moved pt's roommate to another room and made pt a No Roommate per Jenelle Krishna. Administered  PRN PO ativan 2 mg, benadryl 50 mg, haldol 5 mg at this time.     2030 pt continues to hide in shower area and lay on the floor between the beds in the room. Remains suspicious, no signs of aggression noted at this time.   "

## 2025-03-23 NOTE — PLAN OF CARE
Problem: Adult Behavioral Health Plan of Care  Goal: Plan of Care Review  Outcome: Progressing  Flowsheets (Taken 3/23/2025 1109)  Patient Agreement with Plan of Care: agrees  Plan of Care Reviewed With: patient  Goal: Patient-Specific Goal (Individualization)  Outcome: Progressing  Flowsheets (Taken 3/23/2025 1109)  Patient Personal Strengths: independent living skills  Patient Vulnerabilities: substance abuse/addiction  Goal: Adheres to Safety Considerations for Self and Others  Outcome: Progressing  Flowsheets (Taken 3/23/2025 1109)  Adheres to Safety Considerations for Self and Others: making progress toward outcome  Intervention: Develop and Maintain Individualized Safety Plan  Flowsheets (Taken 3/23/2025 1109)  Safety Measures: safety rounds completed  Goal: Absence of New-Onset Illness or Injury  Outcome: Progressing  Intervention: Identify and Manage Fall Risk  Flowsheets (Taken 3/23/2025 1109)  Safety Measures: safety rounds completed  Intervention: Prevent VTE (Venous Thromboembolism)  Flowsheets (Taken 3/23/2025 1109)  VTE Prevention/Management: fluids promoted  Intervention: Prevent Infection  Flowsheets (Taken 3/23/2025 1109)  Infection Prevention: hand hygiene promoted  Goal: Optimized Coping Skills in Response to Life Stressors  Outcome: Progressing  Flowsheets (Taken 3/23/2025 1109)  Optimized Coping Skills in Response to Life Stressors: making progress toward outcome  Intervention: Promote Effective Coping Strategies  Flowsheets (Taken 3/23/2025 1109)  Supportive Measures: self-care encouraged  Goal: Develops/Participates in Therapeutic [place] to Support Successful Transition  Outcome: Progressing  Flowsheets (Taken 3/23/2025 1109)  Develops/Participates in Therapeutic [place] to Support Successful Transition: making progress toward outcome  Intervention: Foster Therapeutic [place]  Flowsheets (Taken 3/23/2025 1109)  Trust Relationship/Rapport: care explained  Goal: Rounds/Family  Conference  Outcome: Progressing  Flowsheets (Taken 3/23/2025 1109)  Participants:   milieu/psych techs   nursing     Problem: Psychotic Signs/Symptoms  Goal: Improved Behavioral Control (Psychotic Signs/Symptoms)  Outcome: Progressing  Flowsheets (Taken 3/23/2025 1109)  Mutually Determined Action Steps (Improved Behavioral Control): identifies symptoms triggers  Intervention: Manage Behavior  Flowsheets (Taken 3/23/2025 1109)  De-Escalation Techniques: quiet time facilitated  Goal: Optimal Cognitive Function (Psychotic Signs/Symptoms)  Outcome: Progressing  Flowsheets (Taken 3/23/2025 1109)  Mutually Determined Action Steps (Optimal Cognitive Function): participates in attention training  Intervention: Support and Promote Cognitive Ability  Flowsheets (Taken 3/23/2025 1109)  Trust Relationship/Rapport: care explained  Goal: Increased Participation and Engagement (Psychotic Signs/Symptoms)  Outcome: Progressing  Flowsheets (Taken 3/23/2025 1109)  Mutually Determined Action Steps (Increased Participation and Engagement): identifies symptoms triggers  Intervention: Facilitate Participation and Engagement  Flowsheets (Taken 3/23/2025 1109)  Supportive Measures: self-care encouraged  Diversional Activity: television  Goal: Improved Mood Symptoms (Psychotic Signs/Symptoms)  Outcome: Progressing  Flowsheets (Taken 3/23/2025 1109)  Mutually Determined Action Steps (Improved Mood Symptoms): acknowledges progress  Intervention: Optimize Emotion and Mood  Flowsheets (Taken 3/23/2025 1109)  Supportive Measures: self-care encouraged  Diversional Activity: television  Goal: Improved Psychomotor Symptoms (Psychotic Signs/Symptoms)  Outcome: Progressing  Flowsheets (Taken 3/23/2025 1109)  Mutually Determined Action Steps (Improved Psychomotor Symptoms): exhibits decrease in agitation  Intervention: Manage Psychomotor Movement  Flowsheets (Taken 3/23/2025 1109)  Activity (Behavioral Health): up ad dimitrios  Patient Performed Hygiene:  teeth brushed  Diversional Activity: television  Goal: Decreased Sensory Symptoms (Psychotic Signs/Symptoms)  Outcome: Progressing  Flowsheets (Taken 3/23/2025 1109)  Mutually Determined Action Steps (Decreased Sensory Symptoms): shares insight re: need for meds  Intervention: Minimize and Manage Sensory Impairment  Flowsheets (Taken 3/23/2025 1109)  Sensory Stimulation Regulation: quiet environment promoted  Goal: Improved Sleep (Psychotic Signs/Symptoms)  Outcome: Progressing  Flowsheets (Taken 3/23/2025 1109)  Mutually Determined Action Steps (Improved Sleep): sleeps 4-6 hours at night  Intervention: Promote Healthy Sleep Hygiene  Flowsheets (Taken 3/23/2025 1109)  Sleep Hygiene Promotion: regular sleep pattern promoted  Goal: Enhanced Social, Occupational or Functional Skills (Psychotic Signs/Symptoms)  Outcome: Progressing  Flowsheets (Taken 3/23/2025 1109)  Mutually Determined Action Steps (Enhanced Social, Occupational or Functional Skills): participates in social skills training  Intervention: Promote Social, Occupational and Functional Ability  Flowsheets (Taken 3/23/2025 1109)  Trust Relationship/Rapport: care explained  Social Functional Ability Promotion: autonomy promoted     Problem: Excessive Substance Use  Goal: Optimized Energy Level (Excessive Substance Use)  Outcome: Progressing  Flowsheets (Taken 3/23/2025 1109)  Mutually Determined Action Steps (Optimized Energy Level): grooms self without prompting  Intervention: Optimize Energy Level  Flowsheets (Taken 3/23/2025 1109)  Activity (Behavioral Health): up ad dimitrios  Patient Performed Hygiene: teeth brushed  Diversional Activity: television  Goal: Improved Behavioral Control (Excessive Substance Use)  Outcome: Progressing  Flowsheets (Taken 3/23/2025 1109)  Mutually Determined Action Steps (Improved Behavioral Control): identifies major stressors  Intervention: Promote Behavior and Impulse Control  Flowsheets (Taken 3/23/2025 1109)  Behavior  Management: behavioral plan reviewed  Goal: Increased Participation and Engagement (Excessive Substance Use)  Outcome: Progressing  Flowsheets (Taken 3/23/2025 1109)  Mutually Determined Action Steps (Increased Participation and Engagement): discusses ongoing recovery plan  Intervention: Facilitate Participation and Engagement  Flowsheets (Taken 3/23/2025 1109)  Supportive Measures: self-care encouraged  Diversional Activity: television  Goal: Improved Physiologic Symptoms (Excessive Substance Use)  Outcome: Progressing  Flowsheets (Taken 3/23/2025 1109)  Mutually Determined Action Steps (Improved Physiologic Symptoms): discusses use pattern  Intervention: Optimize Physiologic Function  Flowsheets (Taken 3/23/2025 1109)  Oral Nutrition Promotion: rest periods promoted  Nutrition Interventions: food preferences provided  Goal: Enhanced Social, Occupational or Functional Skills (Excessive Substance Use)  Outcome: Progressing  Flowsheets (Taken 3/23/2025 1109)  Mutually Determined Action Steps (Enhanced Social, Occupational or Functional Skills): participates in social skills training  Intervention: Promote Social, Occupational and Functional Ability  Flowsheets (Taken 3/23/2025 1109)  Trust Relationship/Rapport: care explained  Social Functional Ability Promotion: autonomy promoted    He is AAO X 4. Flat affect and blunted mood. Guarded. Suspicious. Denies SI, HI, and hallucinations. Endorses paranoid delusions. Good eye contact noted. Speech normal tone and speed. Minimal interactions noted with other patients and staff. Continue plan of care and provide a safe and therapeutic environment. Continue to monitor every fifteen minutes for safety.

## 2025-03-23 NOTE — PLAN OF CARE
Problem: Adult Behavioral Health Plan of Care  Goal: Plan of Care Review  Outcome: Not Progressing  Flowsheets (Taken 3/22/2025 2149)  Patient Agreement with Plan of Care: refuses to participate  Plan of Care Reviewed With: patient  Goal: Patient-Specific Goal (Individualization)  Outcome: Not Progressing  Flowsheets (Taken 3/22/2025 2149)  Patient Personal Strengths: family/social support  Patient Vulnerabilities: substance abuse/addiction  Goal: Adheres to Safety Considerations for Self and Others  Outcome: Not Progressing  Flowsheets (Taken 3/22/2025 2149)  Adheres to Safety Considerations for Self and Others: unable to achieve outcome  Intervention: Develop and Maintain Individualized Safety Plan  Flowsheets (Taken 3/22/2025 2149)  Safety Measures: safety rounds completed  Goal: Absence of New-Onset Illness or Injury  Outcome: Not Progressing  Intervention: Identify and Manage Fall Risk  Flowsheets (Taken 3/22/2025 2149)  Safety Measures: safety rounds completed  Intervention: Prevent Skin Injury  Flowsheets (Taken 3/22/2025 2149)  Device Skin Pressure Protection: adhesive use limited  Intervention: Prevent VTE (Venous Thromboembolism)  Flowsheets (Taken 3/22/2025 2149)  VTE Prevention/Management: fluids promoted  Intervention: Prevent Infection  Flowsheets (Taken 3/22/2025 2149)  Infection Prevention: rest/sleep promoted  Goal: Optimized Coping Skills in Response to Life Stressors  Outcome: Not Progressing  Flowsheets (Taken 3/22/2025 2149)  Optimized Coping Skills in Response to Life Stressors: unable to achieve outcome  Intervention: Promote Effective Coping Strategies  Flowsheets (Taken 3/22/2025 2149)  Supportive Measures: self-care encouraged  Goal: Develops/Participates in Therapeutic [place] to Support Successful Transition  Outcome: Not Progressing  Flowsheets (Taken 3/22/2025 2149)  Develops/Participates in Therapeutic [place] to Support Successful Transition: unable to achieve  outcome  Intervention: Foster Therapeutic [place]  Flowsheets (Taken 3/22/2025 2149)  Trust Relationship/Rapport: care explained  Goal: Rounds/Family Conference  Outcome: Not Progressing     Problem: Psychotic Signs/Symptoms  Goal: Improved Behavioral Control (Psychotic Signs/Symptoms)  Outcome: Not Progressing  Flowsheets (Taken 3/22/2025 2149)  Mutually Determined Action Steps (Improved Behavioral Control): identifies symptoms triggers  Intervention: Manage Behavior  Flowsheets (Taken 3/22/2025 2149)  De-Escalation Techniques:   appropriate behavior reinforced   medication administered  Goal: Optimal Cognitive Function (Psychotic Signs/Symptoms)  Outcome: Not Progressing  Flowsheets (Taken 3/22/2025 2149)  Mutually Determined Action Steps (Optimal Cognitive Function): participates in attention training  Intervention: Support and Promote Cognitive Ability  Flowsheets (Taken 3/22/2025 2149)  Trust Relationship/Rapport: care explained  Goal: Increased Participation and Engagement (Psychotic Signs/Symptoms)  Outcome: Not Progressing  Flowsheets (Taken 3/22/2025 2149)  Mutually Determined Action Steps (Increased Participation and Engagement): identifies symptoms triggers  Intervention: Facilitate Participation and Engagement  Flowsheets (Taken 3/22/2025 2149)  Supportive Measures: self-care encouraged  Diversional Activity: television  Goal: Improved Mood Symptoms (Psychotic Signs/Symptoms)  Outcome: Not Progressing  Flowsheets (Taken 3/22/2025 2149)  Mutually Determined Action Steps (Improved Mood Symptoms): acknowledges progress  Intervention: Optimize Emotion and Mood  Flowsheets (Taken 3/22/2025 2149)  Supportive Measures: self-care encouraged  Diversional Activity: television  Goal: Improved Psychomotor Symptoms (Psychotic Signs/Symptoms)  Outcome: Not Progressing  Flowsheets (Taken 3/22/2025 2149)  Mutually Determined Action Steps (Improved Psychomotor Symptoms): exhibits decrease in agitation  Intervention:  Manage Psychomotor Movement  Flowsheets (Taken 3/22/2025 2149)  Activity (Behavioral Health): up ad dimitrios  Diversional Activity: television  Goal: Decreased Sensory Symptoms (Psychotic Signs/Symptoms)  Outcome: Not Progressing  Flowsheets (Taken 3/22/2025 2149)  Mutually Determined Action Steps (Decreased Sensory Symptoms): shares insight re: need for meds  Intervention: Minimize and Manage Sensory Impairment  Flowsheets (Taken 3/22/2025 2149)  Sensory Stimulation Regulation:   auditory stimulation minimized   quiet environment promoted  Goal: Improved Sleep (Psychotic Signs/Symptoms)  Outcome: Not Progressing  Flowsheets (Taken 3/22/2025 2149)  Mutually Determined Action Steps (Improved Sleep): sleeps 4-6 hours at night  Intervention: Promote Healthy Sleep Hygiene  Flowsheets (Taken 3/22/2025 2149)  Sleep Hygiene Promotion: regular sleep pattern promoted  Goal: Enhanced Social, Occupational or Functional Skills (Psychotic Signs/Symptoms)  Outcome: Not Progressing  Flowsheets (Taken 3/22/2025 2149)  Mutually Determined Action Steps (Enhanced Social, Occupational or Functional Skills): participates in social skills training  Intervention: Promote Social, Occupational and Functional Ability  Flowsheets (Taken 3/22/2025 2149)  Trust Relationship/Rapport: care explained  Social Functional Ability Promotion: autonomy promoted     Problem: Excessive Substance Use  Goal: Optimized Energy Level (Excessive Substance Use)  Outcome: Not Progressing  Flowsheets (Taken 3/22/2025 2149)  Mutually Determined Action Steps (Optimized Energy Level): grooms self without prompting  Intervention: Optimize Energy Level  Flowsheets (Taken 3/22/2025 2149)  Activity (Behavioral Health): up ad dimitrios  Diversional Activity: television  Goal: Improved Behavioral Control (Excessive Substance Use)  Outcome: Not Progressing  Flowsheets (Taken 3/22/2025 2149)  Mutually Determined Action Steps (Improved Behavioral Control): identifies major  stressors  Intervention: Promote Behavior and Impulse Control  Flowsheets (Taken 3/22/2025 2149)  Behavior Management: behavioral plan reviewed  Goal: Increased Participation and Engagement (Excessive Substance Use)  Outcome: Not Progressing  Flowsheets (Taken 3/22/2025 2149)  Mutually Determined Action Steps (Increased Participation and Engagement): discusses ongoing recovery plan  Intervention: Facilitate Participation and Engagement  Flowsheets (Taken 3/22/2025 2149)  Supportive Measures: self-care encouraged  Diversional Activity: television  Goal: Improved Physiologic Symptoms (Excessive Substance Use)  Outcome: Not Progressing  Flowsheets (Taken 3/22/2025 2149)  Mutually Determined Action Steps (Improved Physiologic Symptoms): discusses use pattern  Intervention: Optimize Physiologic Function  Flowsheets (Taken 3/22/2025 2149)  Oral Nutrition Promotion: rest periods promoted  Nutrition Interventions: food preferences provided  Goal: Enhanced Social, Occupational or Functional Skills (Excessive Substance Use)  Outcome: Not Progressing  Flowsheets (Taken 3/22/2025 2149)  Mutually Determined Action Steps (Enhanced Social, Occupational or Functional Skills): participates in social skills training  Intervention: Promote Social, Occupational and Functional Ability  Flowsheets (Taken 3/22/2025 2149)  Trust Relationship/Rapport: care explained  Social Functional Ability Promotion: autonomy promoted   Awake and alert. Uncooperative. Paranoid. Responding to internal stimuli. Was found hiding behind his door with his shoes in his hands like he was ready to hit someone. Given po medication for agitation. Made into a no roommate by unit supervisor due to extreme paranoia and potential for violence. Continue plan of care and provide a safe and therapeutic environment. Every fifteen minute rounding continued for safety.

## 2025-03-23 NOTE — NURSING
0515 pt pacing in hallway, c/o anxiety. Administered PRN atarax 50 mg at this time.     0600 pt continues to pace in hallway, requires constant redirection, attempting to walk down the female side of the hallway. Pt states he is bored, advised pt to read his pt handbook in his room until the day room opens.

## 2025-03-24 PROBLEM — F25.9 SCHIZOAFFECTIVE DISORDER: Status: ACTIVE | Noted: 2025-03-24

## 2025-03-24 PROBLEM — F11.10 OPIOID ABUSE, CONTINUOUS: Status: ACTIVE | Noted: 2025-03-24

## 2025-03-24 PROBLEM — F41.1 GENERALIZED ANXIETY DISORDER: Status: ACTIVE | Noted: 2025-03-24

## 2025-03-24 PROBLEM — F10.10 ALCOHOL ABUSE, CONTINUOUS: Status: ACTIVE | Noted: 2025-03-24

## 2025-03-24 PROCEDURE — 25000003 PHARM REV CODE 250: Performed by: NURSE PRACTITIONER

## 2025-03-24 PROCEDURE — 12400001 HC PSYCH SEMI-PRIVATE ROOM

## 2025-03-24 PROCEDURE — 25000003 PHARM REV CODE 250: Performed by: PSYCHIATRY & NEUROLOGY

## 2025-03-24 RX ORDER — SERTRALINE HYDROCHLORIDE 50 MG/1
50 TABLET, FILM COATED ORAL DAILY
Status: DISCONTINUED | OUTPATIENT
Start: 2025-03-24 | End: 2025-03-27 | Stop reason: HOSPADM

## 2025-03-24 RX ADMIN — PALIPERIDONE 3 MG: 3 TABLET, EXTENDED RELEASE ORAL at 08:03

## 2025-03-24 RX ADMIN — SERTRALINE HYDROCHLORIDE 50 MG: 50 TABLET ORAL at 11:03

## 2025-03-24 RX ADMIN — TRAZODONE HYDROCHLORIDE 100 MG: 100 TABLET ORAL at 08:03

## 2025-03-24 RX ADMIN — HYDROXYZINE HYDROCHLORIDE 50 MG: 50 TABLET, FILM COATED ORAL at 05:03

## 2025-03-24 NOTE — NURSING
"PRN Administration Note:    Behavior:    Patient (Chance Lainez is a 44 y.o. male, : 1980, MRN: [id number])     Allergies: Patient has no known allergies.    Chance's  height is 5' 10" (1.778 m) and weight is 99 kg (218 lb 3.2 oz). His temperature is 97.8 °F (36.6 °C). His blood pressure is 107/71 and his pulse is 113 (abnormal). His respiration is 18 and oxygen saturation is 98%.     Reason for PRN Administration:anxiety    Intervention:    Administered Hydroxyzine 50mg po q 4 hrs per physician order to Chance       Response:    Chance tolerated administration well.      Plan:     Continue to monitor per MD/PA/APRN orders; and reevaluate medication effectiveness within 30 minutes.   "

## 2025-03-24 NOTE — PROGRESS NOTES
Refused despite encouragement, Alternative materials were offered.   03/24/25 1500   [facility] Group Therapy   Group Name Therapeutic Recreation   Specific Interventions Skilled Activity Leisure Education and Awareness   Participation Level None   Participation Quality Refused

## 2025-03-24 NOTE — PLAN OF CARE
Problem: Adult Behavioral Health Plan of Care  Goal: Plan of Care Review  Outcome: Not Progressing  Flowsheets (Taken 3/23/2025 2048)  Patient Agreement with Plan of Care: agrees  Plan of Care Reviewed With: patient  Goal: Patient-Specific Goal (Individualization)  Outcome: Not Progressing  Flowsheets (Taken 3/23/2025 2048)  Patient Personal Strengths: independent living skills  Patient Vulnerabilities: substance abuse/addiction  Goal: Adheres to Safety Considerations for Self and Others  Outcome: Not Progressing  Flowsheets (Taken 3/23/2025 2048)  Adheres to Safety Considerations for Self and Others: making progress toward outcome  Intervention: Develop and Maintain Individualized Safety Plan  Flowsheets (Taken 3/23/2025 2048)  Safety Measures: safety plan reviewed  Goal: Absence of New-Onset Illness or Injury  Outcome: Not Progressing  Intervention: Identify and Manage Fall Risk  Flowsheets (Taken 3/23/2025 2048)  Safety Measures: safety plan reviewed  Intervention: Prevent Skin Injury  Flowsheets (Taken 3/23/2025 2048)  Device Skin Pressure Protection: adhesive use limited  Intervention: Prevent VTE (Venous Thromboembolism)  Flowsheets (Taken 3/23/2025 2048)  VTE Prevention/Management: fluids promoted  Intervention: Prevent Infection  Flowsheets (Taken 3/23/2025 2048)  Infection Prevention: hand hygiene promoted  Goal: Optimized Coping Skills in Response to Life Stressors  Outcome: Not Progressing  Flowsheets (Taken 3/23/2025 2048)  Optimized Coping Skills in Response to Life Stressors: making progress toward outcome  Intervention: Promote Effective Coping Strategies  Flowsheets (Taken 3/23/2025 2048)  Supportive Measures: self-care encouraged  Goal: Develops/Participates in Therapeutic [place] to Support Successful Transition  Outcome: Not Progressing  Flowsheets (Taken 3/23/2025 2048)  Develops/Participates in Therapeutic [place] to Support Successful Transition: making progress toward  outcome  Intervention: Foster Therapeutic [place]  Flowsheets (Taken 3/23/2025 2048)  Trust Relationship/Rapport: care explained  Intervention: Mutually Develop Transition Plan  Flowsheets (Taken 3/23/2025 2048)  Transition Support: follow-up care discussed  Goal: Rounds/Family Conference  Outcome: Not Progressing     Problem: Psychotic Signs/Symptoms  Goal: Improved Behavioral Control (Psychotic Signs/Symptoms)  Outcome: Not Progressing  Flowsheets (Taken 3/23/2025 2048)  Mutually Determined Action Steps (Improved Behavioral Control): identifies symptoms triggers  Intervention: Manage Behavior  Flowsheets (Taken 3/23/2025 2048)  De-Escalation Techniques: quiet time facilitated  Goal: Optimal Cognitive Function (Psychotic Signs/Symptoms)  Outcome: Not Progressing  Flowsheets (Taken 3/23/2025 2048)  Mutually Determined Action Steps (Optimal Cognitive Function): participates in attention training  Intervention: Support and Promote Cognitive Ability  Flowsheets (Taken 3/23/2025 2048)  Trust Relationship/Rapport: care explained  Goal: Increased Participation and Engagement (Psychotic Signs/Symptoms)  Outcome: Not Progressing  Flowsheets (Taken 3/23/2025 2048)  Mutually Determined Action Steps (Increased Participation and Engagement): identifies symptoms triggers  Intervention: Facilitate Participation and Engagement  Flowsheets (Taken 3/23/2025 2048)  Supportive Measures: self-care encouraged  Diversional Activity: television  Goal: Improved Mood Symptoms (Psychotic Signs/Symptoms)  Outcome: Not Progressing  Flowsheets (Taken 3/23/2025 2048)  Mutually Determined Action Steps (Improved Mood Symptoms): acknowledges progress  Intervention: Optimize Emotion and Mood  Flowsheets (Taken 3/23/2025 2048)  Supportive Measures: self-care encouraged  Diversional Activity: television  Goal: Improved Psychomotor Symptoms (Psychotic Signs/Symptoms)  Outcome: Not Progressing  Flowsheets (Taken 3/23/2025 2048)  Mutually Determined  Action Steps (Improved Psychomotor Symptoms):   adheres to medication regimen   exhibits decrease in agitation  Intervention: Manage Psychomotor Movement  Flowsheets (Taken 3/23/2025 2048)  Activity (Behavioral Health): up ad dimitrios  Diversional Activity: television  Goal: Decreased Sensory Symptoms (Psychotic Signs/Symptoms)  Outcome: Not Progressing  Flowsheets (Taken 3/23/2025 2048)  Mutually Determined Action Steps (Decreased Sensory Symptoms): adheres to medication regimen  Intervention: Minimize and Manage Sensory Impairment  Flowsheets (Taken 3/23/2025 2048)  Sensory Stimulation Regulation: quiet environment promoted  Goal: Improved Sleep (Psychotic Signs/Symptoms)  Outcome: Not Progressing  Flowsheets (Taken 3/23/2025 2048)  Mutually Determined Action Steps (Improved Sleep): sleeps 4-6 hours at night  Intervention: Promote Healthy Sleep Hygiene  Flowsheets (Taken 3/23/2025 2048)  Sleep Hygiene Promotion: regular sleep pattern promoted  Goal: Enhanced Social, Occupational or Functional Skills (Psychotic Signs/Symptoms)  Outcome: Not Progressing  Flowsheets (Taken 3/23/2025 2048)  Mutually Determined Action Steps (Enhanced Social, Occupational or Functional Skills): participates in social skills training  Intervention: Promote Social, Occupational and Functional Ability  Flowsheets (Taken 3/23/2025 2048)  Trust Relationship/Rapport: care explained  Social Functional Ability Promotion: autonomy promoted     Problem: Excessive Substance Use  Goal: Optimized Energy Level (Excessive Substance Use)  Outcome: Not Progressing  Flowsheets (Taken 3/23/2025 2048)  Mutually Determined Action Steps (Optimized Energy Level): grooms self without prompting  Intervention: Optimize Energy Level  Flowsheets (Taken 3/23/2025 2048)  Activity (Behavioral Health): up ad dimitrios  Diversional Activity: television  Goal: Improved Behavioral Control (Excessive Substance Use)  Outcome: Not Progressing  Flowsheets (Taken 3/23/2025  2048)  Mutually Determined Action Steps (Improved Behavioral Control): identifies major stressors  Intervention: Promote Behavior and Impulse Control  Flowsheets (Taken 3/23/2025 2048)  Behavior Management: behavioral plan developed  Goal: Increased Participation and Engagement (Excessive Substance Use)  Outcome: Not Progressing  Flowsheets (Taken 3/23/2025 2048)  Mutually Determined Action Steps (Increased Participation and Engagement): discusses ongoing recovery plan  Intervention: Facilitate Participation and Engagement  Flowsheets (Taken 3/23/2025 2048)  Supportive Measures: self-care encouraged  Diversional Activity: television  Goal: Improved Physiologic Symptoms (Excessive Substance Use)  Outcome: Not Progressing  Flowsheets (Taken 3/23/2025 2048)  Mutually Determined Action Steps (Improved Physiologic Symptoms): discusses use pattern  Intervention: Optimize Physiologic Function  Flowsheets (Taken 3/23/2025 2048)  Oral Nutrition Promotion: rest periods promoted  Nutrition Interventions: food preferences provided  Goal: Enhanced Social, Occupational or Functional Skills (Excessive Substance Use)  Outcome: Not Progressing  Flowsheets (Taken 3/23/2025 2048)  Mutually Determined Action Steps (Enhanced Social, Occupational or Functional Skills): participates in social skills training  Intervention: Promote Social, Occupational and Functional Ability  Flowsheets (Taken 3/23/2025 2048)  Trust Relationship/Rapport: care explained  Social Functional Ability Promotion: autonomy promoted   Awake and alert. Uncooperative. Paranoid. Responding to internal stimuli. Given po medication for agitation. No roommate due to extreme paranoia and potential for violence. Continue plan of care and provide a safe and therapeutic environment. Every fifteen minute rounding continued for safety.

## 2025-03-24 NOTE — PLAN OF CARE
Problem: Adult Behavioral Health Plan of Care  Goal: Plan of Care Review  Outcome: Progressing  Flowsheets (Taken 3/24/2025 1040)  Patient Agreement with Plan of Care: agrees  Plan of Care Reviewed With: patient  Goal: Patient-Specific Goal (Individualization)  Outcome: Progressing  Flowsheets (Taken 3/24/2025 1040)  Patient Personal Strengths: independent living skills  Patient Vulnerabilities: substance abuse/addiction  Goal: Adheres to Safety Considerations for Self and Others  Outcome: Progressing  Flowsheets (Taken 3/24/2025 1040)  Adheres to Safety Considerations for Self and Others: making progress toward outcome  Intervention: Develop and Maintain Individualized Safety Plan  Flowsheets (Taken 3/24/2025 1040)  Safety Measures:   safety plan reviewed   safety rounds completed  Goal: Absence of New-Onset Illness or Injury  Outcome: Progressing  Intervention: Identify and Manage Fall Risk  Flowsheets (Taken 3/24/2025 1040)  Safety Measures:   safety plan reviewed   safety rounds completed  Intervention: Prevent VTE (Venous Thromboembolism)  Flowsheets (Taken 3/24/2025 1040)  VTE Prevention/Management: fluids promoted  Intervention: Prevent Infection  Flowsheets (Taken 3/24/2025 1040)  Infection Prevention:   rest/sleep promoted   hand hygiene promoted  Goal: Optimized Coping Skills in Response to Life Stressors  Outcome: Progressing  Flowsheets (Taken 3/24/2025 1040)  Optimized Coping Skills in Response to Life Stressors: making progress toward outcome  Intervention: Promote Effective Coping Strategies  Flowsheets (Taken 3/24/2025 1040)  Supportive Measures:   active listening utilized   self-care encouraged   verbalization of feelings encouraged  Goal: Develops/Participates in Therapeutic [place] to Support Successful Transition  Outcome: Progressing  Flowsheets (Taken 3/24/2025 1040)  Develops/Participates in Therapeutic [place] to Support Successful Transition: making progress toward  outcome  Intervention: Foster Therapeutic [place]  Flowsheets (Taken 3/24/2025 1040)  Trust Relationship/Rapport:   care explained   questions encouraged   thoughts/feelings acknowledged  Goal: Rounds/Family Conference  Outcome: Progressing  Flowsheets (Taken 3/24/2025 1040)  Participants:   milieu/psych techs   nursing   psychiatrist     Problem: Psychotic Signs/Symptoms  Goal: Improved Behavioral Control (Psychotic Signs/Symptoms)  Outcome: Progressing  Flowsheets (Taken 3/24/2025 1040)  Mutually Determined Action Steps (Improved Behavioral Control): identifies symptoms triggers  Intervention: Manage Behavior  Flowsheets (Taken 3/24/2025 1040)  De-Escalation Techniques: quiet time facilitated  Goal: Optimal Cognitive Function (Psychotic Signs/Symptoms)  Outcome: Progressing  Flowsheets (Taken 3/24/2025 1040)  Mutually Determined Action Steps (Optimal Cognitive Function): participates in attention training  Intervention: Support and Promote Cognitive Ability  Flowsheets (Taken 3/24/2025 1040)  Trust Relationship/Rapport:   care explained   questions encouraged   thoughts/feelings acknowledged  Goal: Increased Participation and Engagement (Psychotic Signs/Symptoms)  Outcome: Progressing  Flowsheets (Taken 3/24/2025 1040)  Mutually Determined Action Steps (Increased Participation and Engagement): identifies symptoms triggers  Intervention: Facilitate Participation and Engagement  Flowsheets (Taken 3/24/2025 1040)  Supportive Measures:   active listening utilized   self-care encouraged   verbalization of feelings encouraged  Diversional Activity: television  Goal: Improved Mood Symptoms (Psychotic Signs/Symptoms)  Outcome: Progressing  Flowsheets (Taken 3/24/2025 1040)  Mutually Determined Action Steps (Improved Mood Symptoms): acknowledges progress  Intervention: Optimize Emotion and Mood  Flowsheets (Taken 3/24/2025 1040)  Supportive Measures:   active listening utilized   self-care encouraged   verbalization of  feelings encouraged  Diversional Activity: television  Goal: Improved Psychomotor Symptoms (Psychotic Signs/Symptoms)  Outcome: Progressing  Flowsheets (Taken 3/24/2025 1040)  Mutually Determined Action Steps (Improved Psychomotor Symptoms): adheres to medication regimen  Intervention: Manage Psychomotor Movement  Flowsheets (Taken 3/24/2025 1040)  Activity (Behavioral Health): up ad dimitrios  Patient Performed Hygiene: teeth brushed  Diversional Activity: television  Goal: Decreased Sensory Symptoms (Psychotic Signs/Symptoms)  Outcome: Progressing  Flowsheets (Taken 3/24/2025 1040)  Mutually Determined Action Steps (Decreased Sensory Symptoms): adheres to medication regimen  Intervention: Minimize and Manage Sensory Impairment  Flowsheets (Taken 3/24/2025 1040)  Sensory Stimulation Regulation: quiet environment promoted  Goal: Improved Sleep (Psychotic Signs/Symptoms)  Outcome: Progressing  Flowsheets (Taken 3/24/2025 1040)  Mutually Determined Action Steps (Improved Sleep): sleeps 4-6 hours at night  Intervention: Promote Healthy Sleep Hygiene  Flowsheets (Taken 3/24/2025 1040)  Sleep Hygiene Promotion:   regular sleep pattern promoted   awakenings minimized  Goal: Enhanced Social, Occupational or Functional Skills (Psychotic Signs/Symptoms)  Outcome: Progressing  Flowsheets (Taken 3/24/2025 1040)  Mutually Determined Action Steps (Enhanced Social, Occupational or Functional Skills): participates in social skills training  Intervention: Promote Social, Occupational and Functional Ability  Flowsheets (Taken 3/24/2025 1040)  Trust Relationship/Rapport:   care explained   questions encouraged   thoughts/feelings acknowledged  Social Functional Ability Promotion: autonomy promoted     Problem: Excessive Substance Use  Goal: Optimized Energy Level (Excessive Substance Use)  Outcome: Progressing  Flowsheets (Taken 3/24/2025 1040)  Mutually Determined Action Steps (Optimized Energy Level): grooms self without  prompting  Intervention: Optimize Energy Level  Flowsheets (Taken 3/24/2025 1040)  Activity (Behavioral Health): up ad dimitrios  Patient Performed Hygiene: teeth brushed  Diversional Activity: television  Goal: Improved Behavioral Control (Excessive Substance Use)  Outcome: Progressing  Flowsheets (Taken 3/24/2025 1040)  Mutually Determined Action Steps (Improved Behavioral Control): identifies major stressors  Goal: Increased Participation and Engagement (Excessive Substance Use)  Outcome: Progressing  Flowsheets (Taken 3/24/2025 1040)  Mutually Determined Action Steps (Increased Participation and Engagement): discusses ongoing recovery plan  Intervention: Facilitate Participation and Engagement  Flowsheets (Taken 3/24/2025 1040)  Supportive Measures:   active listening utilized   self-care encouraged   verbalization of feelings encouraged  Diversional Activity: television  Goal: Improved Physiologic Symptoms (Excessive Substance Use)  Outcome: Progressing  Flowsheets (Taken 3/24/2025 1040)  Mutually Determined Action Steps (Improved Physiologic Symptoms): discusses use pattern  Goal: Enhanced Social, Occupational or Functional Skills (Excessive Substance Use)  Outcome: Progressing  Flowsheets (Taken 3/24/2025 1040)  Mutually Determined Action Steps (Enhanced Social, Occupational or Functional Skills): participates in social skills training  Intervention: Promote Social, Occupational and Functional Ability  Flowsheets (Taken 3/24/2025 1040)  Trust Relationship/Rapport:   care explained   questions encouraged   thoughts/feelings acknowledged  Social Functional Ability Promotion: autonomy promoted

## 2025-03-24 NOTE — PROGRESS NOTES
03/24/25 1000   U Group Therapy   Group Name Therapeutic Recreation   Specific Interventions Skilled Activity Stress Management   Participation Level None   Participation Quality Refused;Withdrawn   Insight/Motivation None   Affect/Mood Display Blunted;Flat   Cognition Alert   Psychomotor WNL

## 2025-03-24 NOTE — NURSING
"PRN Medication Follow-up Note:    Behavior:    Patient (Chance Lainez is a 44 y.o. male, : 1980, MRN: [id number])     Allergies: Patient has no known allergies.    Chance's  height is 5' 10" (1.778 m) and weight is 99 kg (218 lb 3.2 oz). His temperature is 97.8 °F (36.6 °C). His blood pressure is 107/71 and his pulse is 113 (abnormal). His respiration is 18 and oxygen saturation is 98%.     Administered Atarax 50mg po per physician order to Chance       Intervention:    Intervention to Chance's response: Laying down in room.       Response:    Chance's response: ineffective       Plan:     Continue to monitor per MD/PA/APRN orders; and reevaluate medication effectiveness within 30 minutes.   "

## 2025-03-24 NOTE — PROGRESS NOTES
"3/24/2025  Chance Lainez   1980   [id number]        Psychiatry Progress Note     Chief Complaint: "Going through some depression and some things."    SUBJECTIVE:   Chance Lainez is a 44 y.o. male placed under a Physician's Emergency Certificate at [facility] after being found sitting in his parked car in the roadway for several hours. Pt admitted to EMS that he used Kratom and ETOH.     Patient states that he has been "picking up some old habits."  Patient states that he had been staying at a [facility] facility and left after 6 months.  Prior to that, he was on Invega and Zoloft.  However, he states that that they did not want him on any psychiatric medication.  Invega restarted on admission.  Will restart Zoloft.      UDS: (-)  Blood alcohol: 11    Current Medications:   Scheduled Meds:    nicotine  1 patch Transdermal Daily    paliperidone  3 mg Oral Daily      PRN Meds:   Current Facility-Administered Medications:     acetaminophen, 650 mg, Oral, Q6H PRN    aluminum-magnesium hydroxide-simethicone, 30 mL, Oral, Q6H PRN    cloNIDine, 0.1 mg, Oral, Q8H PRN    cloNIDine, 0.2 mg, Oral, Q8H PRN    haloperidoL, 5 mg, Oral, Q6H PRN **AND** diphenhydrAMINE, 50 mg, Oral, Q6H PRN **AND** LORazepam, 2 mg, Oral, Q6H PRN **AND** haloperidol lactate, 5 mg, Intramuscular, Q6H PRN **AND** diphenhydrAMINE, 50 mg, Intramuscular, Q6H PRN **AND** lorazepam, 2 mg, Intramuscular, Q6H PRN    hydrOXYzine HCL, 50 mg, Oral, Q4H PRN    magnesium hydroxide 400 mg/5 ml, 30 mL, Oral, Daily PRN    ondansetron, 4 mg, Oral, Q6H PRN    traZODone, 100 mg, Oral, Nightly PRN   Psychotherapeutics (From admission, onward)      Start     Stop Route Frequency Ordered    03/23/25 1215  paliperidone 24 hr tablet 3 mg         -- Oral Daily 03/23/25 1101    03/22/25 1654  haloperidoL tablet 5 mg  (Med - Acute  Behavioral Management)        Placed in "And" Linked Group    -- Oral Every 6 hours PRN 03/22/25 1654    03/22/25 1654  LORazepam " "tablet 2 mg  (Med - Acute  Behavioral Management)        Placed in "And" Linked Group    -- Oral Every 6 hours PRN 03/22/25 1654    03/22/25 1654  haloperidol lactate injection 5 mg  (Med - Acute  Behavioral Management)        Placed in "And" Linked Group    -- IM Every 6 hours PRN 03/22/25 1654    03/22/25 1654  LORazepam injection 2 mg  (Med - Acute  Behavioral Management)        Placed in "And" Linked Group    -- IM Every 6 hours PRN 03/22/25 1654    03/22/25 1654  traZODone tablet 100 mg         -- Oral Nightly PRN 03/22/25 1654            Allergies:   Review of patient's allergies indicates:  No Known Allergies     OBJECTIVE:   Vitals   Vitals:    03/23/25 1600   BP: (!) 158/106   Pulse: 85   Resp: 18   Temp: 98.3 °F (36.8 °C)        Labs/Imaging/Studies:   No results found for this or any previous visit (from the past 36 hours).       Medical Review Of Systems:  Constitutional: negative  Respiratory: negative  Cardiovascular: negative  Gastrointestinal: negative  Genitourinary:negative  Musculoskeletal:negative  Neurological: negative       Psychiatric Mental Status Exam:  General Appearance: appears stated age, well-developed, well-nourished  Arousal: alert  Behavior: cooperative  Movements and Motor Activity: no abnormal involuntary movements noted  Orientation: oriented to person, place, time, and situation  Speech: normal rate, normal rhythm, normal volume, normal tone  Mood: Depressed  Affect: constricted  Thought Process: linear  Associations: intact  Thought Content and Perceptions: no suicidal ideation (questionable), no homicidal ideation, no auditory hallucinations, no visual hallucinations, no paranoid ideation, no ideas of reference  Recent and Remote Memory: recent memory intact, remote memory intact; per interview/observation with patient  Attention and Concentration: intact, attentive to conversation; per interview/observation with patient  Fund of Knowledge: intact, aware of current events, " vocabulary appropriate; based on history, vocabulary, fund of knowledge, syntax, grammar, and content  Insight: questionable; based on understanding of severity of illness and HPI  Judgment: questionable; based on patient's behavior and HPI     ASSESSMENT/PLAN:   Problems Addressed/Diagnoses:  Schizoaffective disorder, unspecified (F25.9)  Generalized Anxiety Disorder (F41.1)  Alcohol use disorder (F10.10)  Opioid use disorder (F11.10)      Past Medical History:   Diagnosis Date    Bipolar disorder, unspecified     History of psychiatric hospitalization     Schizophrenia, unspecified     Seizures         Plan:  Schizoaffective disorder, chronic with acute exacerbation  -Continue Invega    Anxiety, chronic with acute exacerbation  -Zoloft 50mg daily    Alcohol use, chronic with acute exacerbation  -Group/Individual psychotherapy     Opioid use, chronic with acute exacerbation  -Group/Individual psychotherapy       Expected Disposition Plan:  CHRISTOPHER Stern M.D.

## 2025-03-24 NOTE — PROGRESS NOTES
Chance is a 43y/o male admitted for Schizoaffective disorder, unspecified (F25.9), Generalized Anxiety Disorder (F41.1), Alcohol use disorder (F10.10), and Opioid use disorder (F11.10) with a -UDS and 11.0BAL. CTRS met with Pt 1:1, Chance was guarded, appearing anxious and paranoid, with Pt reported being non-compliant with his medications for six weeks, using meth & kratom, and They are out go get me and I don't know why. Chance reported admission as Having delusional problems and ability to perform his ADL's. CTRS educated Pt to TR group times and dates with Chance agreeing to attend TR groups. Chance reported treatment goal as Stress management.       03/24/25 1311   General   Admit Date 03/22/25   Primary Diagnosis Schizoaffective disorder, unspecified (F25.9), Generalized Anxiety Disorder (F41.1)   Secondary Diagnosis Alcohol use disorder (F10.10) and Opioid use disorder (F11.10)   [employer]   Number of Children 2   Children Living? 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 No   If you were to take part in activities, which of the following would you prefer? Both   Do you feel like you have enough to keep you busy now? Yes   Do you believe that you have the opportunity for physical activity? Yes   Activity Capabilities Maximum   Subjective   Patient states Having delusional problems   Precautions   General Precautions seizure   Assessment   Mobility ambulates independently   Transfers independently   Musculoskeletal   (none reported)   Visual Acuity normal vision   Visual Perception depth perception;color perception;recognizes letters;recognizes numbers   Hearing normal   Speech/Communication normal   Cognitive Concerns oriented x4   Emotional Concerns appears anxious;appears depressed;appears isolated   Leisure Interest Survey   Leisure Interest Survey Yes   Social/Group Activities   Religious/Sikhism Current Interest   Parties/Seasonal Program  Current Interest   Shopping Current Interest   Volunteering Current Interest   Group Discuss Current Interest   Current Events Current Interest   Restaurant Current Interest   Solitary Activities   Listenting to books on tape Current Interest   Reading Current Interest   Physical Activities   [place] Current Interest   Creative Activities   Wood Working Current Interest   Playing Musical Instru Current Interest   Outdoor Activities   Camping Current Interest   Spectator Events   Concerts Current Interest   Sporting Events Current Interest   Passive Games   Card Games Current Interest   Goals   Additional Documentation yes   Goal Formulation With patient   Time For Goal Achievement 7 days   Goal 1 Stress management   Goal 1-Progress Ongoing-slowly progressing,   Plan   Planned Therapy Intervention Group Recreational Therapy   Expected Length of Stay 5-7days   PT Frequency Minimum of 3 visits per week

## 2025-03-24 NOTE — NURSING
"Daily Nursing Note:      Behavior:    Patient (Chance Lainez is a 44 y.o. male, : 1980, MRN: [id number]) demonstrating an affect that was sad and flat. Chance demonstrating mood that is depressed and anxious. Chance had an appearance that was clean and good hygiene. Chance denies suicidal ideation. Chance denies suicide plan. Chance denies homicidal ideation. Chance endorses hallucinations.    Chance's  height is 5' 10" (1.778 m) and weight is 99 kg (218 lb 3.2 oz). His temperature is 98.3 °F (36.8 °C). His blood pressure is 158/106 (abnormal) and his pulse is 85. His respiration is 18 and oxygen saturation is 98%.       Intervention:    Encourage Chance to perform self-hygiene, grooming, and changing of clothing. Monitor Chance's behavior and program compliance. Monitor Chance for suicidal ideation, homicidal ideation, sleep disturbance, and hallucinations. Encourage Chance to eat all portions of meals and assess for meal preferences. Monitor Chance for intake and output to ensure hydration. Notify the Physician/Physician Assistant/Advance Practice Registered Nurse (MD/PA/APRN) for any medication refusal and any change in patient condition.      Response:    Chance verbalizes understand of unit process and procedures.     Plan:     Continue to monitor per MD/PA/APRN orders; maintain patient safety.   "

## 2025-03-25 PROCEDURE — 12400001 HC PSYCH SEMI-PRIVATE ROOM

## 2025-03-25 PROCEDURE — 25000003 PHARM REV CODE 250: Performed by: NURSE PRACTITIONER

## 2025-03-25 PROCEDURE — 25000003 PHARM REV CODE 250: Performed by: PSYCHIATRY & NEUROLOGY

## 2025-03-25 PROCEDURE — S4991 NICOTINE PATCH NONLEGEND: HCPCS | Performed by: PSYCHIATRY & NEUROLOGY

## 2025-03-25 RX ADMIN — NICOTINE 1 PATCH: 21 PATCH, EXTENDED RELEASE TRANSDERMAL at 08:03

## 2025-03-25 RX ADMIN — ACETAMINOPHEN 650 MG: 325 TABLET, FILM COATED ORAL at 11:03

## 2025-03-25 RX ADMIN — PALIPERIDONE 3 MG: 3 TABLET, EXTENDED RELEASE ORAL at 08:03

## 2025-03-25 RX ADMIN — SERTRALINE HYDROCHLORIDE 50 MG: 50 TABLET ORAL at 08:03

## 2025-03-25 RX ADMIN — HYDROXYZINE HYDROCHLORIDE 50 MG: 50 TABLET, FILM COATED ORAL at 11:03

## 2025-03-25 NOTE — NURSING
At 1140, complaints of headache, PS 5/10 and complaints of moderate anxiety.  Tylenol 650 mg., and Atarax 50 mg., administered.  At 1240, verbalizes decreasing pain, PS 2/10 and verbalizes mild anxiety.

## 2025-03-25 NOTE — PROGRESS NOTES
"3/25/2025  Chance Lainez   1980   [id number]        Psychiatry Progress Note     Chief Complaint: "Going through some depression and some things."    SUBJECTIVE:   Chance Lainez is a 44 y.o. male placed under a Physician's Emergency Certificate at [facility] after being found sitting in his parked car in the roadway for several hours. Pt admitted to EMS that he used Kratom and ETOH.     Today patient states that he is feeling OK. Staff report significant improvement in mood and affect. Tolerating medications well without issue. Denies any AVH. Patient does report some slight dizziness today but does not indicate that it is intolerable. Will monitor this closely. Will continue current POC and monitor for need to augment.       UDS: (-)  Blood alcohol: 11    Current Medications:   Scheduled Meds:    nicotine  1 patch Transdermal Daily    paliperidone  3 mg Oral Daily    sertraline  50 mg Oral Daily      PRN Meds:   Current Facility-Administered Medications:     acetaminophen, 650 mg, Oral, Q6H PRN    aluminum-magnesium hydroxide-simethicone, 30 mL, Oral, Q6H PRN    cloNIDine, 0.1 mg, Oral, Q8H PRN    cloNIDine, 0.2 mg, Oral, Q8H PRN    haloperidoL, 5 mg, Oral, Q6H PRN **AND** diphenhydrAMINE, 50 mg, Oral, Q6H PRN **AND** LORazepam, 2 mg, Oral, Q6H PRN **AND** haloperidol lactate, 5 mg, Intramuscular, Q6H PRN **AND** diphenhydrAMINE, 50 mg, Intramuscular, Q6H PRN **AND** lorazepam, 2 mg, Intramuscular, Q6H PRN    hydrOXYzine HCL, 50 mg, Oral, Q4H PRN    magnesium hydroxide 400 mg/5 ml, 30 mL, Oral, Daily PRN    ondansetron, 4 mg, Oral, Q6H PRN    traZODone, 100 mg, Oral, Nightly PRN   Psychotherapeutics (From admission, onward)      Start     Stop Route Frequency Ordered    03/24/25 1045  sertraline tablet 50 mg         -- Oral Daily 03/24/25 1030    03/23/25 1215  paliperidone 24 hr tablet 3 mg         -- Oral Daily 03/23/25 1101    03/22/25 1654  haloperidoL tablet 5 mg  (Med - Acute  Behavioral " "Management)        Placed in "And" Linked Group    -- Oral Every 6 hours PRN 03/22/25 1654    03/22/25 1654  LORazepam tablet 2 mg  (Med - Acute  Behavioral Management)        Placed in "And" Linked Group    -- Oral Every 6 hours PRN 03/22/25 1654    03/22/25 1654  haloperidol lactate injection 5 mg  (Med - Acute  Behavioral Management)        Placed in "And" Linked Group    -- IM Every 6 hours PRN 03/22/25 1654    03/22/25 1654  LORazepam injection 2 mg  (Med - Acute  Behavioral Management)        Placed in "And" Linked Group    -- IM Every 6 hours PRN 03/22/25 1654    03/22/25 1654  traZODone tablet 100 mg         -- Oral Nightly PRN 03/22/25 1654            Allergies:   Review of patient's allergies indicates:  No Known Allergies     OBJECTIVE:   Vitals   Vitals:    03/25/25 0701   BP: 108/77   Pulse: 106   Resp: 18   Temp: 98.1 °F (36.7 °C)        Labs/Imaging/Studies:   No results found for this or any previous visit (from the past 36 hours).       Medical Review Of Systems:  Constitutional: negative  Respiratory: negative  Cardiovascular: negative  Gastrointestinal: negative  Genitourinary:negative  Musculoskeletal:negative  Neurological: negative       Psychiatric Mental Status Exam:  General Appearance: appears stated age, well-developed, well-nourished  Arousal: alert  Behavior: cooperative  Movements and Motor Activity: no abnormal involuntary movements noted  Orientation: oriented to person, place, time, and situation  Speech: normal rate, normal rhythm, normal volume, normal tone  Mood: Better  Affect: constricted  Thought Process: linear  Associations: intact  Thought Content and Perceptions: no suicidal ideation (questionable), no homicidal ideation, no auditory hallucinations, no visual hallucinations, no paranoid ideation, no ideas of reference  Recent and Remote Memory: recent memory intact, remote memory intact; per interview/observation with patient  Attention and Concentration: intact, attentive " to conversation; per interview/observation with patient  Fund of Knowledge: intact, aware of current events, vocabulary appropriate; based on history, vocabulary, fund of knowledge, syntax, grammar, and content  Insight: questionable; based on understanding of severity of illness and HPI  Judgment: questionable; based on patient's behavior and HPI    ASSESSMENT/PLAN:   Problems Addressed/Diagnoses:  Schizoaffective disorder, unspecified (F25.9)  Generalized Anxiety Disorder (F41.1)  Alcohol use disorder (F10.10)  Opioid use disorder (F11.10)      Past Medical History:   Diagnosis Date    Bipolar disorder, unspecified     History of psychiatric hospitalization     Schizophrenia, unspecified     Seizures         Plan:  Schizoaffective disorder, chronic with acute exacerbation  -Continue Invega    Anxiety, chronic with acute exacerbation  -Zoloft 50mg daily    Alcohol use, chronic with acute exacerbation  -Group/Individual psychotherapy     Opioid use, chronic with acute exacerbation  -Group/Individual psychotherapy       Expected Disposition Plan:  CHRISTOPHER Rivas, [id number]

## 2025-03-25 NOTE — NURSING
2036 Pt requested PRN for sleep. Administered trazodone 100 mg po.         2200 Pt in bed, eyes closed, resting quietly.

## 2025-03-25 NOTE — PLAN OF CARE
Problem: Adult Behavioral Health Plan of Care  Goal: Plan of Care Review  Outcome: Progressing  Flowsheets (Taken 3/25/2025 0530)  Patient Agreement with Plan of Care: agrees  Plan of Care Reviewed With: patient  Goal: Patient-Specific Goal (Individualization)  Outcome: Progressing  Flowsheets (Taken 3/25/2025 0530)  Patient Personal Strengths: independent living skills  Patient Vulnerabilities: substance abuse/addiction  Goal: Adheres to Safety Considerations for Self and Others  Outcome: Progressing  Flowsheets (Taken 3/25/2025 0530)  Adheres to Safety Considerations for Self and Others: making progress toward outcome  Intervention: Develop and Maintain Individualized Safety Plan  Flowsheets (Taken 3/25/2025 0530)  Safety Measures: safety rounds completed  Goal: Absence of New-Onset Illness or Injury  Outcome: Progressing  Intervention: Identify and Manage Fall Risk  Flowsheets (Taken 3/25/2025 0530)  Safety Measures: safety rounds completed  Intervention: Prevent Skin Injury  Flowsheets (Taken 3/25/2025 0530)  Device Skin Pressure Protection: adhesive use limited  Intervention: Prevent VTE (Venous Thromboembolism)  Flowsheets (Taken 3/25/2025 0530)  VTE Prevention/Management: fluids promoted  Intervention: Prevent Infection  Flowsheets (Taken 3/25/2025 0530)  Infection Prevention: rest/sleep promoted  Goal: Optimized Coping Skills in Response to Life Stressors  Outcome: Progressing  Flowsheets (Taken 3/25/2025 0530)  Optimized Coping Skills in Response to Life Stressors: making progress toward outcome  Intervention: Promote Effective Coping Strategies  Flowsheets (Taken 3/25/2025 0530)  Supportive Measures:   active listening utilized   self-care encouraged  Goal: Develops/Participates in Therapeutic Lawrence to Support Successful Transition  Outcome: Progressing  Flowsheets (Taken 3/25/2025 0530)  Develops/Participates in Therapeutic [place] to Support Successful Transition: making progress toward  outcome  Intervention: Foster Therapeutic Kenneth  Flowsheets (Taken 3/25/2025 0530)  Trust Relationship/Rapport:   care explained   questions encouraged   thoughts/feelings acknowledged  Goal: Rounds/Family Conference  Outcome: Progressing     Problem: Psychotic Signs/Symptoms  Goal: Improved Behavioral Control (Psychotic Signs/Symptoms)  Outcome: Progressing  Flowsheets (Taken 3/25/2025 0530)  Mutually Determined Action Steps (Improved Behavioral Control): identifies symptoms triggers  Intervention: Manage Behavior  Flowsheets (Taken 3/25/2025 0530)  De-Escalation Techniques: quiet time facilitated  Goal: Optimal Cognitive Function (Psychotic Signs/Symptoms)  Outcome: Progressing  Flowsheets (Taken 3/25/2025 0530)  Mutually Determined Action Steps (Optimal Cognitive Function): participates in attention training  Intervention: Support and Promote Cognitive Ability  Flowsheets (Taken 3/25/2025 0530)  Trust Relationship/Rapport:   care explained   questions encouraged   thoughts/feelings acknowledged  Goal: Increased Participation and Engagement (Psychotic Signs/Symptoms)  Outcome: Progressing  Flowsheets (Taken 3/25/2025 0530)  Mutually Determined Action Steps (Increased Participation and Engagement): identifies symptoms triggers  Intervention: Facilitate Participation and Engagement  Flowsheets (Taken 3/25/2025 0530)  Supportive Measures:   active listening utilized   self-care encouraged  Diversional Activity: television  Goal: Improved Mood Symptoms (Psychotic Signs/Symptoms)  Outcome: Progressing  Flowsheets (Taken 3/25/2025 0530)  Mutually Determined Action Steps (Improved Mood Symptoms): acknowledges progress  Intervention: Optimize Emotion and Mood  Flowsheets (Taken 3/25/2025 0530)  Supportive Measures:   active listening utilized   self-care encouraged  Diversional Activity: television  Goal: Improved Psychomotor Symptoms (Psychotic Signs/Symptoms)  Outcome: Progressing  Flowsheets (Taken 3/25/2025  0530)  Mutually Determined Action Steps (Improved Psychomotor Symptoms): adheres to medication regimen  Intervention: Manage Psychomotor Movement  Flowsheets (Taken 3/25/2025 0530)  Activity (Behavioral Health): up ad dimitrios  Diversional Activity: television  Goal: Decreased Sensory Symptoms (Psychotic Signs/Symptoms)  Outcome: Progressing  Flowsheets (Taken 3/25/2025 0530)  Mutually Determined Action Steps (Decreased Sensory Symptoms): adheres to medication regimen  Intervention: Minimize and Manage Sensory Impairment  Flowsheets (Taken 3/25/2025 0530)  Sensory Stimulation Regulation: quiet environment promoted  Goal: Improved Sleep (Psychotic Signs/Symptoms)  Outcome: Progressing  Flowsheets (Taken 3/25/2025 0530)  Mutually Determined Action Steps (Improved Sleep): sleeps 4-6 hours at night  Intervention: Promote Healthy Sleep Hygiene  Flowsheets (Taken 3/25/2025 0530)  Sleep Hygiene Promotion:   regular sleep pattern promoted   awakenings minimized  Goal: Enhanced Social, Occupational or Functional Skills (Psychotic Signs/Symptoms)  Outcome: Progressing  Flowsheets (Taken 3/25/2025 0530)  Mutually Determined Action Steps (Enhanced Social, Occupational or Functional Skills): participates in social skills training  Intervention: Promote Social, Occupational and Functional Ability  Flowsheets (Taken 3/25/2025 0530)  Trust Relationship/Rapport:   care explained   questions encouraged   thoughts/feelings acknowledged  Social Functional Ability Promotion: autonomy promoted     Problem: Excessive Substance Use  Goal: Optimized Energy Level (Excessive Substance Use)  Outcome: Progressing  Flowsheets (Taken 3/25/2025 0530)  Mutually Determined Action Steps (Optimized Energy Level): grooms self without prompting  Intervention: Optimize Energy Level  Flowsheets (Taken 3/25/2025 0530)  Activity (Behavioral Health): up ad dimitrios  Diversional Activity: television  Goal: Improved Behavioral Control (Excessive Substance Use)  Outcome:  Progressing  Flowsheets (Taken 3/25/2025 0530)  Mutually Determined Action Steps (Improved Behavioral Control): identifies major stressors  Intervention: Promote Behavior and Impulse Control  Flowsheets (Taken 3/25/2025 0530)  Behavior Management: behavioral plan developed  Goal: Increased Participation and Engagement (Excessive Substance Use)  Outcome: Progressing  Flowsheets (Taken 3/25/2025 0530)  Mutually Determined Action Steps (Increased Participation and Engagement): discusses ongoing recovery plan  Intervention: Facilitate Participation and Engagement  Flowsheets (Taken 3/25/2025 0530)  Supportive Measures:   active listening utilized   self-care encouraged  Diversional Activity: television  Goal: Improved Physiologic Symptoms (Excessive Substance Use)  Outcome: Progressing  Flowsheets (Taken 3/25/2025 0530)  Mutually Determined Action Steps (Improved Physiologic Symptoms): verbalizes physical symptoms  Intervention: Optimize Physiologic Function  Flowsheets (Taken 3/25/2025 0530)  Oral Nutrition Promotion: rest periods promoted  Nutrition Interventions: food preferences provided  Goal: Enhanced Social, Occupational or Functional Skills (Excessive Substance Use)  Outcome: Progressing  Flowsheets (Taken 3/25/2025 0530)  Mutually Determined Action Steps (Enhanced Social, Occupational or Functional Skills): participates in social skills training  Intervention: Promote Social, Occupational and Functional Ability  Flowsheets (Taken 3/25/2025 0530)  Trust Relationship/Rapport:   care explained   questions encouraged   thoughts/feelings acknowledged  Social Functional Abili  AAOx3. Paranoid and suspicious of his surroundings. Mostly isolating to assigned room. Denies SI, HI, and hallucinations. Plan of care and provide a safe and therapeutic environment. Every fifteen minute rounding continued for safety.

## 2025-03-25 NOTE — PLAN OF CARE
Problem: Adult Behavioral Health Plan of Care  Goal: Plan of Care Review  Outcome: Progressing  Flowsheets (Taken 3/25/2025 1309)  Patient Agreement with Plan of Care: agrees  Plan of Care Reviewed With: patient  Goal: Patient-Specific Goal (Individualization)  Outcome: Progressing  Flowsheets (Taken 3/25/2025 1309)  Patient Personal Strengths: independent living skills  Patient Vulnerabilities: substance abuse/addiction  Goal: Adheres to Safety Considerations for Self and Others  Outcome: Progressing  Flowsheets (Taken 3/25/2025 1309)  Adheres to Safety Considerations for Self and Others: making progress toward outcome  Intervention: Develop and Maintain Individualized Safety Plan  Flowsheets (Taken 3/25/2025 1309)  Safety Measures: safety rounds completed  Goal: Absence of New-Onset Illness or Injury  Outcome: Progressing  Intervention: Identify and Manage Fall Risk  Flowsheets (Taken 3/25/2025 1309)  Safety Measures: safety rounds completed  Intervention: Prevent VTE (Venous Thromboembolism)  Flowsheets (Taken 3/25/2025 1309)  VTE Prevention/Management: fluids promoted  Intervention: Prevent Infection  Flowsheets (Taken 3/25/2025 1309)  Infection Prevention: hand hygiene promoted  Goal: Optimized Coping Skills in Response to Life Stressors  Outcome: Progressing  Flowsheets (Taken 3/25/2025 1309)  Optimized Coping Skills in Response to Life Stressors: making progress toward outcome  Intervention: Promote Effective Coping Strategies  Flowsheets (Taken 3/25/2025 1309)  Supportive Measures: self-reflection promoted  Goal: Develops/Participates in Therapeutic [place] to Support Successful Transition  Outcome: Progressing  Flowsheets (Taken 3/25/2025 1309)  Develops/Participates in Therapeutic [place] to Support Successful Transition: making progress toward outcome  Intervention: Foster Therapeutic Broadbent  Flowsheets (Taken 3/25/2025 1309)  Trust Relationship/Rapport: care explained  Goal: Rounds/Family  Conference  Outcome: Progressing  Flowsheets (Taken 3/25/2025 1309)  Participants:   milieu/psych techs   nursing     Problem: Psychotic Signs/Symptoms  Goal: Improved Behavioral Control (Psychotic Signs/Symptoms)  Outcome: Progressing  Flowsheets (Taken 3/25/2025 1309)  Mutually Determined Action Steps (Improved Behavioral Control): identifies symptoms triggers  Intervention: Manage Behavior  Flowsheets (Taken 3/25/2025 1309)  De-Escalation Techniques: quiet time facilitated  Goal: Optimal Cognitive Function (Psychotic Signs/Symptoms)  Outcome: Progressing  Flowsheets (Taken 3/25/2025 1309)  Mutually Determined Action Steps (Optimal Cognitive Function): participates in attention training  Intervention: Support and Promote Cognitive Ability  Flowsheets (Taken 3/25/2025 1309)  Trust Relationship/Rapport: care explained  Goal: Increased Participation and Engagement (Psychotic Signs/Symptoms)  Outcome: Progressing  Flowsheets (Taken 3/25/2025 1309)  Mutually Determined Action Steps (Increased Participation and Engagement): identifies symptoms triggers  Intervention: Facilitate Participation and Engagement  Flowsheets (Taken 3/25/2025 1309)  Supportive Measures: self-reflection promoted  Diversional Activity: television  Goal: Improved Mood Symptoms (Psychotic Signs/Symptoms)  Outcome: Progressing  Flowsheets (Taken 3/25/2025 1309)  Mutually Determined Action Steps (Improved Mood Symptoms): acknowledges progress  Intervention: Optimize Emotion and Mood  Flowsheets (Taken 3/25/2025 1309)  Supportive Measures: self-reflection promoted  Diversional Activity: television  Goal: Improved Psychomotor Symptoms (Psychotic Signs/Symptoms)  Outcome: Progressing  Flowsheets (Taken 3/25/2025 1309)  Mutually Determined Action Steps (Improved Psychomotor Symptoms): adheres to medication regimen  Intervention: Manage Psychomotor Movement  Flowsheets (Taken 3/25/2025 1309)  Activity (Behavioral Health): up ad dimitrios  Patient Performed  Hygiene: teeth brushed  Diversional Activity: television  Goal: Decreased Sensory Symptoms (Psychotic Signs/Symptoms)  Outcome: Progressing  Flowsheets (Taken 3/25/2025 1309)  Mutually Determined Action Steps (Decreased Sensory Symptoms): adheres to medication regimen  Intervention: Minimize and Manage Sensory Impairment  Flowsheets (Taken 3/25/2025 1309)  Sensory Stimulation Regulation: quiet environment promoted  Goal: Improved Sleep (Psychotic Signs/Symptoms)  Outcome: Progressing  Flowsheets (Taken 3/25/2025 1309)  Mutually Determined Action Steps (Improved Sleep): sleeps 4-6 hours at night  Intervention: Promote Healthy Sleep Hygiene  Flowsheets (Taken 3/25/2025 1309)  Sleep Hygiene Promotion: regular sleep pattern promoted  Goal: Enhanced Social, Occupational or Functional Skills (Psychotic Signs/Symptoms)  Outcome: Progressing  Flowsheets (Taken 3/25/2025 1309)  Mutually Determined Action Steps (Enhanced Social, Occupational or Functional Skills): participates in social skills training  Intervention: Promote Social, Occupational and Functional Ability  Flowsheets (Taken 3/25/2025 1309)  Trust Relationship/Rapport: care explained  Social Functional Ability Promotion: autonomy promoted     Problem: Excessive Substance Use  Goal: Optimized Energy Level (Excessive Substance Use)  Outcome: Progressing  Flowsheets (Taken 3/25/2025 1309)  Mutually Determined Action Steps (Optimized Energy Level): grooms self without prompting  Intervention: Optimize Energy Level  Flowsheets (Taken 3/25/2025 1309)  Activity (Behavioral Health): up ad dimitrios  Patient Performed Hygiene: teeth brushed  Diversional Activity: television  Goal: Improved Behavioral Control (Excessive Substance Use)  Outcome: Progressing  Flowsheets (Taken 3/25/2025 1309)  Mutually Determined Action Steps (Improved Behavioral Control): identifies major stressors  Intervention: Promote Behavior and Impulse Control  Flowsheets (Taken 3/25/2025 1309)  Behavior  Management: behavioral plan reviewed  Goal: Increased Participation and Engagement (Excessive Substance Use)  Outcome: Progressing  Flowsheets (Taken 3/25/2025 1309)  Mutually Determined Action Steps (Increased Participation and Engagement): discusses ongoing recovery plan  Intervention: Facilitate Participation and Engagement  Flowsheets (Taken 3/25/2025 1309)  Supportive Measures: self-reflection promoted  Diversional Activity: television  Goal: Improved Physiologic Symptoms (Excessive Substance Use)  Outcome: Progressing  Flowsheets (Taken 3/25/2025 1309)  Mutually Determined Action Steps (Improved Physiologic Symptoms): verbalizes physical symptoms  Intervention: Optimize Physiologic Function  Flowsheets (Taken 3/25/2025 1309)  Oral Nutrition Promotion: rest periods promoted  Nutrition Interventions: food preferences provided  Goal: Enhanced Social, Occupational or Functional Skills (Excessive Substance Use)  Outcome: Progressing  Flowsheets (Taken 3/25/2025 1309)  Mutually Determined Action Steps (Enhanced Social, Occupational or Functional Skills): participates in social skills training  Intervention: Promote Social, Occupational and Functional Ability  Flowsheets (Taken 3/25/2025 1309)  Trust Relationship/Rapport: care explained  Social Functional Ability Promotion: autonomy promoted    He is AAO X 4. Flat affect and blunted mood. Denies SI,HI, and hallucinations. Endorses paranoia when he is near other patients. Good eye contact noted. Speech normal tone and speed. Continue plan of care and provide a safe and therapeutic environment. Continue to monitor every fifteen minutes for safety.

## 2025-03-25 NOTE — PROGRESS NOTES
Refused despite encouragement, Alternative materials were offered.   03/25/25 1400   [facility] Group Therapy   Group Name Therapeutic Recreation   Specific Interventions Skilled Activity Leisure Education and Awareness   Participation Level None   Participation Quality Refused

## 2025-03-25 NOTE — PROGRESS NOTES
03/25/25 1000   [facility] Group Therapy   Group Name Therapeutic Recreation   Specific Interventions Skilled Activity Self-Expression   Participation Level None   Participation Quality Lack of Interest   Insight/Motivation None   Affect/Mood Display Blunted;Flat   Cognition RIS;Pre-Occupied   Psychomotor WNL

## 2025-03-25 NOTE — PLAN OF CARE
Behavioral Health Unit  Psychosocial History and Assessment  Progress Note      Patient Name: Chance Lainez YOB: 1980 SW: Sherly Winn Date: 3/25/2025    Chief Complaint: depression, mood swings, and psychosis    Consent:     Did the patient consent for an interview with the ? Yes    Did the patient consent for the  to contact family/friend/caregiver?   Yes  Name: swetha, Relationship: mother, and Contact: [phone]    Did the patient give consent for the  to inform family/friend/caregiver of his/her whereabouts or to discuss discharge planning? Yes    Source of Information: Face to face with patient    Is information obtained from interviews considered reliable?   yes    Reason for Admission:     Active Hospital Problems    Diagnosis  POA    *Schizoaffective disorder [F25.9]  Yes    Generalized anxiety disorder [F41.1]  Yes    Alcohol abuse, continuous [F10.10]  Yes    Opioid abuse, continuous [F11.10]  Yes      Resolved Hospital Problems   No resolved problems to display.       History of Present Illness - (Patient Perception):   I got back from the program (extra [facility]) and I didn't do what I was supposed to do      Present biopsychosocial functioning: irritable, withdrawn    Past biopsychosocial functioning: hx of polysubstance abuse    Family and Marital/Relationship History:     Significant Other/Partner Relationships:  Single:  2 children    Family Relationships: Intact      Childhood History:     Where was [place] raised? [place]    Who raised the patient? Mom and dad      How does patient describe their childhood? craagustiny      Who is patient's primary support person? God      Culture and Taoism:     Taoism: Protestant    How strong of a role does Lutheran and spirituality play in patient's life? moderate    Mormon or spiritual concerns regarding treatment: observation of holy days  and spiritual concerns / distress    History of  "Abuse:   History of Abuse: Denies      Outcome: N/A    Psychiatric and Medical History:     History of psychiatric illness or treatment: prior inpatient treatment, prior suicide attempt(s), and has participated in counseling/psychotherapy on an outpatient basis in the past    Medical history:   Past Medical History:   Diagnosis Date    Bipolar disorder, unspecified     History of psychiatric hospitalization     Schizophrenia, unspecified     Seizures        Substance Abuse History:     Alcohol - (Patient Perspective): pt states that he has been sober for 6 months  Social History     Substance and Sexual Activity   Alcohol Use Yes    Comment: Vodkka daily       Drugs - (Patient Perspective): pt states that he has been clean for 6 months  Social History     Substance and Sexual Activity   Drug Use Yes    Types: Methamphetamines, Fentanyl    Comment: "KRATOM"         Education:     Currently Enrolled? No  Graduated High School    Special Education? No    Interested in Completing Education/GED: No    Employment and Financial:     Currently employed? Unemployed     Source of Income:  none    Able to afford basic needs (food, shelter, utilities)? No    Who manages finances/personal affairs?       Service:     ? no    Combat Service? No     Community Resources:     Describe present use of community resources: inpatient and outpatient      Identify previously used community resources   (Include previous mental health treatment - outpatient and inpatient): inpatient and outpatient     Environmental:     Current living situation:Lives with mom    Social Evaluation:     Patient Assets: General fund of knowledge and Supportive family/friends    Patient Limitations: poor coping skills, potential     High risk psychosocial issues that may impact discharge planning:   None at this time    Recommendations:     Anticipated discharge plan:   outpatient follow up    Community resources needed for discharge " planning:  aftercare treatment sources    Anticipated social work role(s) in treatment and discharge planning: advice and [place], groups, individual as needed and referral to aftercare.    03/23/25 5330   Initial Information   Source of Information patient   Reason for Admission psychosis   Patient Aware of Diagnosis yes   Arrived From emergency department   Spiritual Beliefs   Spiritual, Cultural Beliefs, Yazdanism Practices, Values that Affect Care yes   Description of Beliefs that Will Affect Care [facility]   Substance Use/Withdrawal   Substance Use Active in recovery;Social or intermittent use   Additional Tobacco Use   How many cigarettes do you typically have per day? 0   Abuse Screen (yes response referral indicated)   Feels Unsafe at Home or Work/School no   Feels Threatened by Someone no   Does anyone try to keep you from having contact with others or doing things outside your home? no   Physical Signs of Abuse Present no   Abuse Details   Physical Abuse No   Sexual Abuse No   Emotional Abuse No   If applicable, has the abuse been reported? No   AUDIT-C (Alcohol Use Disorders ID Test)   Alcohol Use In Past Year 0-->never   Alcohol Amount Per Day In Past Year 0-->none   More Than 6 Drinks On One Occasion In Past Year 0-->never   Total Audit C Score 0

## 2025-03-25 NOTE — CARE UPDATE
Sw attempted to contact pt motherArcelia [phone]; number is not reachable.    The patient is a 86y Female complaining of see chief complaint quote.

## 2025-03-26 PROCEDURE — 25000003 PHARM REV CODE 250: Performed by: NURSE PRACTITIONER

## 2025-03-26 PROCEDURE — 25000003 PHARM REV CODE 250: Performed by: PSYCHIATRY & NEUROLOGY

## 2025-03-26 PROCEDURE — 12400001 HC PSYCH SEMI-PRIVATE ROOM

## 2025-03-26 RX ORDER — PALIPERIDONE 3 MG/1
6 TABLET, EXTENDED RELEASE ORAL DAILY
Status: DISCONTINUED | OUTPATIENT
Start: 2025-03-27 | End: 2025-03-27

## 2025-03-26 RX ADMIN — SERTRALINE HYDROCHLORIDE 50 MG: 50 TABLET ORAL at 08:03

## 2025-03-26 RX ADMIN — PALIPERIDONE 3 MG: 3 TABLET, EXTENDED RELEASE ORAL at 08:03

## 2025-03-26 NOTE — PROGRESS NOTES
03/26/25 1500   [facility] Group Therapy   Group Name Therapeutic Recreation   Specific Interventions Skilled Activity Leisure Education and Awareness   Participation Level Active;Supportive;Appropriate;Attentive;Sharing   Participation Quality Cooperative;Social   Insight/Motivation Improved   Affect/Mood Display Appropriate   Cognition RIS   Psychomotor WNL

## 2025-03-26 NOTE — PLAN OF CARE
Problem: Adult Behavioral Health Plan of Care  Goal: Plan of Care Review  Flowsheets (Taken 3/26/2025 0948)  Patient Agreement with Plan of Care: agrees  Plan of Care Reviewed With: patient   Patient met with treatment team to discuss current care plan. Patient agreed to current care plan. Patient verbalized discharge plans that he wants to go to a rehabilitation treatment for substance abuse.       [facility]

## 2025-03-26 NOTE — PROGRESS NOTES
03/26/25 1400   [facility] Group Therapy   Group Name Therapeutic Recreation   Specific Interventions Skilled Activity Mild Exercises   Participation Level Minimal;Supportive   Participation Quality Withdrawn   Insight/Motivation Limited   Affect/Mood Display Blunted;Flat   Cognition RIS;Pre-Occupied   Psychomotor WNL

## 2025-03-26 NOTE — PLAN OF CARE
"Chance attended interdisciplinary treatment team, was indecisive but cooperative, reporting "I'm not as paranoid" but still paranoid with some improvement, and not progressing towards reported treatment goal of Stress management. CTRS reported to interdisciplinary team that Chance does not attend TR groups, is isolative, does not interact with peers, limited but polite with staff, and attends his ADL's with slowly improving insight.      "

## 2025-03-26 NOTE — PLAN OF CARE
Problem: Adult Behavioral Health Plan of Care  Goal: Plan of Care Review  Outcome: Progressing  Flowsheets (Taken 3/26/2025 1005)  Patient Agreement with Plan of Care: agrees  Plan of Care Reviewed With: patient  Goal: Patient-Specific Goal (Individualization)  Outcome: Progressing  Flowsheets (Taken 3/26/2025 1005)  Patient Personal Strengths: independent living skills  Patient Vulnerabilities: substance abuse/addiction  Goal: Adheres to Safety Considerations for Self and Others  Outcome: Progressing  Flowsheets (Taken 3/26/2025 1005)  Adheres to Safety Considerations for Self and Others: making progress toward outcome  Intervention: Develop and Maintain Individualized Safety Plan  Flowsheets (Taken 3/26/2025 1005)  Safety Measures: safety rounds completed  Goal: Absence of New-Onset Illness or Injury  Outcome: Progressing  Intervention: Identify and Manage Fall Risk  Flowsheets (Taken 3/26/2025 1005)  Safety Measures: safety rounds completed  Intervention: Prevent VTE (Venous Thromboembolism)  Flowsheets (Taken 3/26/2025 1005)  VTE Prevention/Management: fluids promoted  Intervention: Prevent Infection  Flowsheets (Taken 3/26/2025 1005)  Infection Prevention: hand hygiene promoted  Goal: Optimized Coping Skills in Response to Life Stressors  Outcome: Progressing  Flowsheets (Taken 3/26/2025 1005)  Optimized Coping Skills in Response to Life Stressors: making progress toward outcome  Intervention: Promote Effective Coping Strategies  Flowsheets (Taken 3/26/2025 1005)  Supportive Measures: self-care encouraged  Goal: Develops/Participates in Therapeutic [place] to Support Successful Transition  Outcome: Progressing  Flowsheets (Taken 3/26/2025 1005)  Develops/Participates in Therapeutic [place] to Support Successful Transition: making progress toward outcome  Intervention: Foster Therapeutic [place]  Flowsheets (Taken 3/26/2025 1005)  Trust Relationship/Rapport: care explained  Goal: Rounds/Family  Conference  Outcome: Progressing  Flowsheets (Taken 3/26/2025 1005)  Participants:   milieu/psych techs   nursing     Problem: Psychotic Signs/Symptoms  Goal: Improved Behavioral Control (Psychotic Signs/Symptoms)  Outcome: Progressing  Flowsheets (Taken 3/26/2025 1005)  Mutually Determined Action Steps (Improved Behavioral Control): identifies symptoms triggers  Intervention: Manage Behavior  Flowsheets (Taken 3/26/2025 1005)  De-Escalation Techniques: quiet time facilitated  Goal: Optimal Cognitive Function (Psychotic Signs/Symptoms)  Outcome: Progressing  Flowsheets (Taken 3/26/2025 1005)  Mutually Determined Action Steps (Optimal Cognitive Function): participates in attention training  Intervention: Support and Promote Cognitive Ability  Flowsheets (Taken 3/26/2025 1005)  Trust Relationship/Rapport: care explained  Goal: Increased Participation and Engagement (Psychotic Signs/Symptoms)  Outcome: Progressing  Flowsheets (Taken 3/26/2025 1005)  Mutually Determined Action Steps (Increased Participation and Engagement): identifies symptoms triggers  Intervention: Facilitate Participation and Engagement  Flowsheets (Taken 3/26/2025 1005)  Supportive Measures: self-care encouraged  Diversional Activity: television  Goal: Improved Mood Symptoms (Psychotic Signs/Symptoms)  Outcome: Progressing  Flowsheets (Taken 3/26/2025 1005)  Mutually Determined Action Steps (Improved Mood Symptoms): acknowledges progress  Intervention: Optimize Emotion and Mood  Flowsheets (Taken 3/26/2025 1005)  Supportive Measures: self-care encouraged  Diversional Activity: television  Goal: Improved Psychomotor Symptoms (Psychotic Signs/Symptoms)  Outcome: Progressing  Flowsheets (Taken 3/26/2025 1005)  Mutually Determined Action Steps (Improved Psychomotor Symptoms): adheres to medication regimen  Intervention: Manage Psychomotor Movement  Flowsheets (Taken 3/26/2025 1005)  Activity (Behavioral Health): up ad dimitrios  Patient Performed Hygiene:  teeth brushed  Diversional Activity: television  Goal: Decreased Sensory Symptoms (Psychotic Signs/Symptoms)  Outcome: Progressing  Flowsheets (Taken 3/26/2025 1005)  Mutually Determined Action Steps (Decreased Sensory Symptoms): adheres to medication regimen  Intervention: Minimize and Manage Sensory Impairment  Flowsheets (Taken 3/26/2025 1005)  Sensory Stimulation Regulation: quiet environment promoted  Goal: Improved Sleep (Psychotic Signs/Symptoms)  Outcome: Progressing  Flowsheets (Taken 3/26/2025 1005)  Mutually Determined Action Steps (Improved Sleep): sleeps 4-6 hours at night  Intervention: Promote Healthy Sleep Hygiene  Flowsheets (Taken 3/26/2025 1005)  Sleep Hygiene Promotion: regular sleep pattern promoted  Goal: Enhanced Social, Occupational or Functional Skills (Psychotic Signs/Symptoms)  Outcome: Progressing  Flowsheets (Taken 3/26/2025 1005)  Mutually Determined Action Steps (Enhanced Social, Occupational or Functional Skills): participates in social skills training  Intervention: Promote Social, Occupational and Functional Ability  Flowsheets (Taken 3/26/2025 1005)  Trust Relationship/Rapport: care explained  Social Functional Ability Promotion: autonomy promoted     Problem: Excessive Substance Use  Goal: Optimized Energy Level (Excessive Substance Use)  Outcome: Progressing  Flowsheets (Taken 3/26/2025 1005)  Mutually Determined Action Steps (Optimized Energy Level): grooms self without prompting  Intervention: Optimize Energy Level  Flowsheets (Taken 3/26/2025 1005)  Activity (Behavioral Health): up ad dimitrios  Patient Performed Hygiene: teeth brushed  Diversional Activity: television  Goal: Improved Behavioral Control (Excessive Substance Use)  Outcome: Progressing  Flowsheets (Taken 3/26/2025 1005)  Mutually Determined Action Steps (Improved Behavioral Control): identifies major stressors  Intervention: Promote Behavior and Impulse Control  Flowsheets (Taken 3/26/2025 1005)  Behavior Management:  behavioral plan reviewed  Goal: Increased Participation and Engagement (Excessive Substance Use)  Outcome: Progressing  Flowsheets (Taken 3/26/2025 1005)  Mutually Determined Action Steps (Increased Participation and Engagement): discusses ongoing recovery plan  Intervention: Facilitate Participation and Engagement  Flowsheets (Taken 3/26/2025 1005)  Supportive Measures: self-care encouraged  Diversional Activity: television  Goal: Improved Physiologic Symptoms (Excessive Substance Use)  Outcome: Progressing  Flowsheets (Taken 3/26/2025 1005)  Mutually Determined Action Steps (Improved Physiologic Symptoms): verbalizes physical symptoms  Intervention: Optimize Physiologic Function  Flowsheets (Taken 3/26/2025 1005)  Oral Nutrition Promotion: rest periods promoted  Nutrition Interventions: food preferences provided  Goal: Enhanced Social, Occupational or Functional Skills (Excessive Substance Use)  Outcome: Progressing  Flowsheets (Taken 3/26/2025 1005)  Mutually Determined Action Steps (Enhanced Social, Occupational or Functional Skills): participates in social skills training  Intervention: Promote Social, Occupational and Functional Ability  Flowsheets (Taken 3/26/2025 1005)  Trust Relationship/Rapport: care explained  Social Functional Ability Promotion: autonomy promoted    He is AAO X 4. Denies SI, HI, and hallucinations. Guarded. Suspicious. Paranoia noted. Good eye contact noted. Speech normal tone and speed. Interacts with selected patients and staff. Continue plan of care and provide a safe and therapeutic environment. Continue to monitor every fifteen minutes for safety.

## 2025-03-26 NOTE — PROGRESS NOTES
"3/26/2025  Chance Lainez   1980   [id number]        Psychiatry Progress Note     Chief Complaint: "I feel a lot better"    SUBJECTIVE:   Chance Lainez is a 44 y.o. male placed under a Physician's Emergency Certificate at [facility] after being found sitting in his parked car in the roadway for several hours. Pt admitted to EMS that he used Kratom and ETOH.     Paranoid per staff.  However, less so today and brighter affect.  Mood improved.  Will increase Invega today to 6mg daily and will monitor progress.  Discussed discharge and aftercare.  I do not believe that the [facility] facility would be the best discharge plan since he would have to discontinue his medications.  Will f/u with this.      UDS: (-)  Blood alcohol: 11    Current Medications:   Scheduled Meds:    nicotine  1 patch Transdermal Daily    paliperidone  3 mg Oral Daily    sertraline  50 mg Oral Daily      PRN Meds:   Current Facility-Administered Medications:     acetaminophen, 650 mg, Oral, Q6H PRN    aluminum-magnesium hydroxide-simethicone, 30 mL, Oral, Q6H PRN    cloNIDine, 0.1 mg, Oral, Q8H PRN    cloNIDine, 0.2 mg, Oral, Q8H PRN    haloperidoL, 5 mg, Oral, Q6H PRN **AND** diphenhydrAMINE, 50 mg, Oral, Q6H PRN **AND** LORazepam, 2 mg, Oral, Q6H PRN **AND** haloperidol lactate, 5 mg, Intramuscular, Q6H PRN **AND** diphenhydrAMINE, 50 mg, Intramuscular, Q6H PRN **AND** lorazepam, 2 mg, Intramuscular, Q6H PRN    hydrOXYzine HCL, 50 mg, Oral, Q4H PRN    magnesium hydroxide 400 mg/5 ml, 30 mL, Oral, Daily PRN    ondansetron, 4 mg, Oral, Q6H PRN    traZODone, 100 mg, Oral, Nightly PRN   Psychotherapeutics (From admission, onward)      Start     Stop Route Frequency Ordered    03/24/25 1045  sertraline tablet 50 mg         -- Oral Daily 03/24/25 1030    03/23/25 1215  paliperidone 24 hr tablet 3 mg         -- Oral Daily 03/23/25 1101    03/22/25 1654  haloperidoL tablet 5 mg  (Med - Acute  Behavioral Management)        Placed in " ""And" Linked Group    -- Oral Every 6 hours PRN 03/22/25 1654    03/22/25 1654  LORazepam tablet 2 mg  (Med - Acute  Behavioral Management)        Placed in "And" Linked Group    -- Oral Every 6 hours PRN 03/22/25 1654    03/22/25 1654  haloperidol lactate injection 5 mg  (Med - Acute  Behavioral Management)        Placed in "And" Linked Group    -- IM Every 6 hours PRN 03/22/25 1654    03/22/25 1654  LORazepam injection 2 mg  (Med - Acute  Behavioral Management)        Placed in "And" Linked Group    -- IM Every 6 hours PRN 03/22/25 1654    03/22/25 1654  traZODone tablet 100 mg         -- Oral Nightly PRN 03/22/25 1654            Allergies:   Review of patient's allergies indicates:  No Known Allergies     OBJECTIVE:   Vitals   Vitals:    03/25/25 1745   BP: 118/83   Pulse:    Resp:    Temp:         Labs/Imaging/Studies:   No results found for this or any previous visit (from the past 36 hours).       Medical Review Of Systems:  Constitutional: negative  Respiratory: negative  Cardiovascular: negative  Gastrointestinal: negative  Genitourinary:negative  Musculoskeletal:negative  Neurological: negative       Psychiatric Mental Status Exam:  General Appearance: appears stated age, well-developed, well-nourished  Arousal: alert  Behavior: cooperative  Movements and Motor Activity: no abnormal involuntary movements noted  Orientation: oriented to person, place, time, and situation  Speech: normal rate, normal rhythm, normal volume, normal tone  Mood: Depressed  Affect: constricted  Thought Process: linear  Associations: intact  Thought Content and Perceptions: (+)paranoid ideation (improving), no suicidal ideation (questionable), no homicidal ideation  Recent and Remote Memory: recent memory intact, remote memory intact; per interview/observation with patient  Attention and Concentration: intact, attentive to conversation; per interview/observation with patient  Fund of Knowledge: intact, aware of current events, " vocabulary appropriate; based on history, vocabulary, fund of knowledge, syntax, grammar, and content  Insight: questionable; based on understanding of severity of illness and HPI  Judgment: questionable; based on patient's behavior and HPI     ASSESSMENT/PLAN:   Problems Addressed/Diagnoses:  Schizoaffective disorder, unspecified (F25.9)  Generalized Anxiety Disorder (F41.1)  Alcohol use disorder (F10.10)  Opioid use disorder (F11.10)      Past Medical History:   Diagnosis Date    Bipolar disorder, unspecified     History of psychiatric hospitalization     Schizophrenia, unspecified     Seizures         Plan:  Schizoaffective disorder, chronic with acute exacerbation  -Increase Invega to 6mg daily    Anxiety, chronic with acute exacerbation  -Zoloft 50mg daily    Alcohol use, chronic with acute exacerbation  -Group/Individual psychotherapy     Opioid use, chronic with acute exacerbation  -Group/Individual psychotherapy       Expected Disposition Plan:  CHRISTOPHER Stern M.D.

## 2025-03-26 NOTE — PLAN OF CARE
Problem: Adult Behavioral Health Plan of Care  Goal: Plan of Care Review  Outcome: Progressing  Flowsheets (Taken 3/25/2025 2328)  Patient Agreement with Plan of Care: agrees  Plan of Care Reviewed With: patient  Goal: Patient-Specific Goal (Individualization)  Outcome: Progressing  Flowsheets (Taken 3/25/2025 2328)  Patient Personal Strengths: independent living skills  Patient Vulnerabilities: substance abuse/addiction  Goal: Adheres to Safety Considerations for Self and Others  Outcome: Progressing  Flowsheets (Taken 3/25/2025 2328)  Adheres to Safety Considerations for Self and Others: making progress toward outcome  Intervention: Develop and Maintain Individualized Safety Plan  Flowsheets (Taken 3/25/2025 2328)  Safety Measures: safety rounds completed  Goal: Absence of New-Onset Illness or Injury  Outcome: Progressing  Intervention: Identify and Manage Fall Risk  Flowsheets (Taken 3/25/2025 2328)  Safety Measures: safety rounds completed  Intervention: Prevent Skin Injury  Flowsheets (Taken 3/25/2025 2328)  Device Skin Pressure Protection: adhesive use limited  Intervention: Prevent VTE (Venous Thromboembolism)  Flowsheets (Taken 3/25/2025 2328)  VTE Prevention/Management: fluids promoted  Intervention: Prevent Infection  Flowsheets (Taken 3/25/2025 2328)  Infection Prevention: hand hygiene promoted  Goal: Optimized Coping Skills in Response to Life Stressors  Outcome: Progressing  Flowsheets (Taken 3/25/2025 2328)  Optimized Coping Skills in Response to Life Stressors: making progress toward outcome  Intervention: Promote Effective Coping Strategies  Flowsheets (Taken 3/25/2025 2328)  Supportive Measures: self-reflection promoted  Goal: Develops/Participates in Therapeutic [place] to Support Successful Transition  Outcome: Progressing  Flowsheets (Taken 3/25/2025 2328)  Develops/Participates in Therapeutic [place] to Support Successful Transition: making progress toward outcome  Intervention: Foster  Therapeutic [place]  Flowsheets (Taken 3/25/2025 2328)  Trust Relationship/Rapport: care explained  Goal: Rounds/Family Conference  Outcome: Progressing     Problem: Psychotic Signs/Symptoms  Goal: Improved Behavioral Control (Psychotic Signs/Symptoms)  Outcome: Progressing  Flowsheets (Taken 3/25/2025 2328)  Mutually Determined Action Steps (Improved Behavioral Control): identifies symptoms triggers  Intervention: Manage Behavior  Flowsheets (Taken 3/25/2025 2328)  De-Escalation Techniques: quiet time facilitated  Goal: Optimal Cognitive Function (Psychotic Signs/Symptoms)  Outcome: Progressing  Flowsheets (Taken 3/25/2025 2328)  Mutually Determined Action Steps (Optimal Cognitive Function): participates in attention training  Intervention: Support and Promote Cognitive Ability  Flowsheets (Taken 3/25/2025 2328)  Trust Relationship/Rapport: care explained  Goal: Increased Participation and Engagement (Psychotic Signs/Symptoms)  Outcome: Progressing  Flowsheets (Taken 3/25/2025 2328)  Mutually Determined Action Steps (Increased Participation and Engagement): identifies symptoms triggers  Intervention: Facilitate Participation and Engagement  Flowsheets (Taken 3/25/2025 2328)  Supportive Measures: self-reflection promoted  Diversional Activity: television  Goal: Improved Mood Symptoms (Psychotic Signs/Symptoms)  Outcome: Progressing  Flowsheets (Taken 3/25/2025 2328)  Mutually Determined Action Steps (Improved Mood Symptoms): acknowledges progress  Intervention: Optimize Emotion and Mood  Flowsheets (Taken 3/25/2025 2328)  Supportive Measures: self-reflection promoted  Diversional Activity: television  Goal: Improved Psychomotor Symptoms (Psychotic Signs/Symptoms)  Outcome: Progressing  Flowsheets (Taken 3/25/2025 2328)  Mutually Determined Action Steps (Improved Psychomotor Symptoms): adheres to medication regimen  Intervention: Manage Psychomotor Movement  Flowsheets (Taken 3/25/2025 2328)  Activity (Behavioral  Health): up ad dimitrios  Diversional Activity: television  Goal: Decreased Sensory Symptoms (Psychotic Signs/Symptoms)  Outcome: Progressing  Flowsheets (Taken 3/25/2025 2328)  Mutually Determined Action Steps (Decreased Sensory Symptoms): adheres to medication regimen  Intervention: Minimize and Manage Sensory Impairment  Flowsheets (Taken 3/25/2025 2328)  Sensory Stimulation Regulation: quiet environment promoted  Goal: Improved Sleep (Psychotic Signs/Symptoms)  Outcome: Progressing  Flowsheets (Taken 3/25/2025 2328)  Mutually Determined Action Steps (Improved Sleep): sleeps 4-6 hours at night  Intervention: Promote Healthy Sleep Hygiene  Flowsheets (Taken 3/25/2025 2328)  Sleep Hygiene Promotion: regular sleep pattern promoted  Goal: Enhanced Social, Occupational or Functional Skills (Psychotic Signs/Symptoms)  Outcome: Progressing  Flowsheets (Taken 3/25/2025 2328)  Mutually Determined Action Steps (Enhanced Social, Occupational or Functional Skills): participates in social skills training  Intervention: Promote Social, Occupational and Functional Ability  Flowsheets (Taken 3/25/2025 2328)  Trust Relationship/Rapport: care explained  Social Functional Ability Promotion: autonomy promoted     Problem: Excessive Substance Use  Goal: Optimized Energy Level (Excessive Substance Use)  Outcome: Progressing  Flowsheets (Taken 3/25/2025 2328)  Mutually Determined Action Steps (Optimized Energy Level): grooms self without prompting  Intervention: Optimize Energy Level  Flowsheets (Taken 3/25/2025 2328)  Activity (Behavioral Health): up ad dimitrios  Diversional Activity: television  Goal: Improved Behavioral Control (Excessive Substance Use)  Outcome: Progressing  Flowsheets (Taken 3/25/2025 2328)  Mutually Determined Action Steps (Improved Behavioral Control): identifies major stressors  Intervention: Promote Behavior and Impulse Control  Flowsheets (Taken 3/25/2025 2328)  Behavior Management: behavioral plan developed  Goal:  Increased Participation and Engagement (Excessive Substance Use)  Outcome: Progressing  Flowsheets (Taken 3/25/2025 2328)  Mutually Determined Action Steps (Increased Participation and Engagement): discusses ongoing recovery plan  Intervention: Facilitate Participation and Engagement  Flowsheets (Taken 3/25/2025 2328)  Supportive Measures: self-reflection promoted  Diversional Activity: television  Goal: Improved Physiologic Symptoms (Excessive Substance Use)  Outcome: Progressing  Flowsheets (Taken 3/25/2025 2328)  Mutually Determined Action Steps (Improved Physiologic Symptoms): verbalizes physical symptoms  Intervention: Optimize Physiologic Function  Flowsheets (Taken 3/25/2025 2328)  Oral Nutrition Promotion: rest periods promoted  Nutrition Interventions: food preferences provided  Goal: Enhanced Social, Occupational or Functional Skills (Excessive Substance Use)  Outcome: Progressing  Flowsheets (Taken 3/25/2025 2328)  Mutually Determined Action Steps (Enhanced Social, Occupational or Functional Skills): participates in social skills training  Intervention: Promote Social, Occupational and Functional Ability  Flowsheets (Taken 3/25/2025 2328)  Trust Relationship/Rapport: care explained  Social Functional Ability Promotion: autonomy promoted  AAOx3. Paranoid and suspicious of his surroundings. Mostly isolating to assigned room. Denies SI, HI, and hallucinations. Plan of care and provide a safe and therapeutic environment. Every fifteen minute rounding continued for safety.

## 2025-03-27 VITALS
BODY MASS INDEX: 31.24 KG/M2 | HEIGHT: 70 IN | OXYGEN SATURATION: 96 % | RESPIRATION RATE: 20 BRPM | SYSTOLIC BLOOD PRESSURE: 110 MMHG | DIASTOLIC BLOOD PRESSURE: 73 MMHG | TEMPERATURE: 98 F | WEIGHT: 218.19 LBS | HEART RATE: 86 BPM

## 2025-03-27 PROCEDURE — 25000003 PHARM REV CODE 250: Performed by: PSYCHIATRY & NEUROLOGY

## 2025-03-27 RX ORDER — PALIPERIDONE 3 MG/1
3 TABLET, EXTENDED RELEASE ORAL DAILY
Qty: 30 TABLET | Refills: 0 | Status: SHIPPED | OUTPATIENT
Start: 2025-03-27 | End: 2025-04-26

## 2025-03-27 RX ORDER — SERTRALINE HYDROCHLORIDE 50 MG/1
50 TABLET, FILM COATED ORAL DAILY
Qty: 30 TABLET | Refills: 0 | Status: SHIPPED | OUTPATIENT
Start: 2025-03-27 | End: 2025-04-26

## 2025-03-27 RX ORDER — IBUPROFEN 200 MG
1 TABLET ORAL DAILY
Qty: 28 PATCH | Refills: 0 | Status: SHIPPED | OUTPATIENT
Start: 2025-03-28 | End: 2025-04-25

## 2025-03-27 RX ORDER — PALIPERIDONE 3 MG/1
3 TABLET, EXTENDED RELEASE ORAL DAILY
Status: DISCONTINUED | OUTPATIENT
Start: 2025-03-28 | End: 2025-03-27 | Stop reason: HOSPADM

## 2025-03-27 RX ADMIN — SERTRALINE HYDROCHLORIDE 50 MG: 50 TABLET ORAL at 08:03

## 2025-03-27 RX ADMIN — PALIPERIDONE 6 MG: 3 TABLET, EXTENDED RELEASE ORAL at 08:03

## 2025-03-27 RX ADMIN — HYDROXYZINE HYDROCHLORIDE 50 MG: 50 TABLET, FILM COATED ORAL at 10:03

## 2025-03-27 NOTE — PLAN OF CARE
Problem: Psychotic Signs/Symptoms  Goal: Improved Behavioral Control (Psychotic Signs/Symptoms)  Outcome: Progressing  Flowsheets (Taken 3/26/2025 2105)  Mutually Determined Action Steps (Improved Behavioral Control): verbalizes gratifying activity  Intervention: Manage Behavior  Flowsheets (Taken 3/26/2025 2105)  De-Escalation Techniques: quiet time facilitated  Goal: Optimal Cognitive Function (Psychotic Signs/Symptoms)  Outcome: Progressing  Flowsheets (Taken 3/26/2025 2105)  Mutually Determined Action Steps (Optimal Cognitive Function): follows step-by-step instructions  Intervention: Support and Promote Cognitive Ability  Flowsheets (Taken 3/26/2025 2105)  Trust Relationship/Rapport:   care explained   questions encouraged   reassurance provided  Goal: Increased Participation and Engagement (Psychotic Signs/Symptoms)  Outcome: Progressing  Flowsheets (Taken 3/26/2025 2105)  Mutually Determined Action Steps (Increased Participation and Engagement): verbalizes gratifying activity  Intervention: Facilitate Participation and Engagement  Flowsheets (Taken 3/26/2025 2105)  Supportive Measures:   self-care encouraged   verbalization of feelings encouraged   active listening utilized  Diversional Activity: television  Goal: Improved Mood Symptoms (Psychotic Signs/Symptoms)  Outcome: Progressing  Flowsheets (Taken 3/26/2025 2105)  Mutually Determined Action Steps (Improved Mood Symptoms): acknowledges progress  Intervention: Optimize Emotion and Mood  Flowsheets (Taken 3/26/2025 2105)  Supportive Measures:   self-care encouraged   verbalization of feelings encouraged   active listening utilized  Diversional Activity: television  Goal: Improved Psychomotor Symptoms (Psychotic Signs/Symptoms)  Outcome: Progressing  Flowsheets (Taken 3/26/2025 2105)  Mutually Determined Action Steps (Improved Psychomotor Symptoms): adheres to medication regimen  Intervention: Manage Psychomotor Movement  Flowsheets (Taken 3/26/2025  2105)  Activity (Behavioral Health): up ad dimitrios  Patient Performed Hygiene: teeth brushed  Diversional Activity: television  Goal: Decreased Sensory Symptoms (Psychotic Signs/Symptoms)  Outcome: Progressing  Flowsheets (Taken 3/26/2025 2105)  Mutually Determined Action Steps (Decreased Sensory Symptoms): adheres to medication regimen  Intervention: Minimize and Manage Sensory Impairment  Flowsheets (Taken 3/26/2025 2105)  Sensory Stimulation Regulation: quiet environment promoted  Goal: Improved Sleep (Psychotic Signs/Symptoms)  Outcome: Progressing  Flowsheets (Taken 3/26/2025 2105)  Mutually Determined Action Steps (Improved Sleep): sleeps 4-6 hours at night  Intervention: Promote Healthy Sleep Hygiene  Flowsheets (Taken 3/26/2025 2105)  Sleep Hygiene Promotion:   awakenings minimized   regular sleep pattern promoted  Goal: Enhanced Social, Occupational or Functional Skills (Psychotic Signs/Symptoms)  Outcome: Progressing  Flowsheets (Taken 3/26/2025 2105)  Mutually Determined Action Steps (Enhanced Social, Occupational or Functional Skills): participates in social skills training  Intervention: Promote Social, Occupational and Functional Ability  Flowsheets (Taken 3/26/2025 2105)  Trust Relationship/Rapport:   care explained   questions encouraged   reassurance provided  Social Functional Ability Promotion:   autonomy promoted   self-expression encouraged   social interaction promoted     Problem: Excessive Substance Use  Goal: Optimized Energy Level (Excessive Substance Use)  Outcome: Progressing  Flowsheets (Taken 3/26/2025 2105)  Mutually Determined Action Steps (Optimized Energy Level): grooms self without prompting  Intervention: Optimize Energy Level  Flowsheets (Taken 3/26/2025 2105)  Activity (Behavioral Health): up ad dimitrios  Patient Performed Hygiene: teeth brushed  Diversional Activity: television  Goal: Improved Behavioral Control (Excessive Substance Use)  Outcome: Progressing  Flowsheets (Taken  3/26/2025 2105)  Mutually Determined Action Steps (Improved Behavioral Control): identifies major stressors  Intervention: Promote Behavior and Impulse Control  Flowsheets (Taken 3/26/2025 2105)  Behavior Management: behavioral plan reviewed  Goal: Increased Participation and Engagement (Excessive Substance Use)  Outcome: Progressing  Flowsheets (Taken 3/26/2025 2105)  Mutually Determined Action Steps (Increased Participation and Engagement): discusses ongoing recovery plan  Intervention: Facilitate Participation and Engagement  Flowsheets (Taken 3/26/2025 2105)  Supportive Measures:   self-care encouraged   verbalization of feelings encouraged   active listening utilized  Diversional Activity: television  Goal: Improved Physiologic Symptoms (Excessive Substance Use)  Outcome: Progressing  Flowsheets (Taken 3/26/2025 2105)  Mutually Determined Action Steps (Improved Physiologic Symptoms): discusses use pattern  Intervention: Optimize Physiologic Function  Flowsheets (Taken 3/26/2025 2105)  Oral Nutrition Promotion:   social interaction promoted   physical activity promoted  Goal: Enhanced Social, Occupational or Functional Skills (Excessive Substance Use)  Outcome: Progressing  Flowsheets (Taken 3/26/2025 2105)  Mutually Determined Action Steps (Enhanced Social, Occupational or Functional Skills): participates in social skills training  Intervention: Promote Social, Occupational and Functional Ability  Flowsheets (Taken 3/26/2025 2105)  Trust Relationship/Rapport:   care explained   questions encouraged   reassurance provided  Social Functional Ability Promotion:   autonomy promoted   self-expression encouraged   social interaction promoted     AAOx4 Pt denies SI/HI/AVH. Pt endorses having anxiety and depression, paranoia improving, reports improved sleep and good appetite. Pt has good eye contact and affect is constricted. Q15 min safety checks continued.

## 2025-03-27 NOTE — PROGRESS NOTES
03/27/25 1000   [facility] Group Therapy   Group Name Therapeutic Recreation   Specific Interventions Skilled Activity Leisure Education and Awareness   Participation Level None   Participation Quality Withdrawn   Insight/Motivation Improved   Affect/Mood Display Restless;Bizarre;Anxious;Agitated   Cognition RIS   Psychomotor WNL

## 2025-03-27 NOTE — PLAN OF CARE
"Chance met reported treatment goal of Stress management".    CTRS Discharge Recommendations:  Encouraged Pt. to actively utilize available community resources to increase leisure involvement to decrease signs and symptoms of illness.  Encouraged Pt. to utilize coping skills on a regular basis to reduce the risk of decomposition and re-hospitalization.      "

## 2025-03-27 NOTE — CARE UPDATE
Sw spoke with pt mother Arcelia [phone], who stated pt can dc to the home located at [address] [place] [address] [place] [id number]       Transition of care faxed to Michaela ROSE.

## 2025-03-27 NOTE — PLAN OF CARE
Problem: Psychotic Signs/Symptoms  Goal: Improved Behavioral Control (Psychotic Signs/Symptoms)  Outcome: Met  Goal: Optimal Cognitive Function (Psychotic Signs/Symptoms)  Outcome: Met  Goal: Increased Participation and Engagement (Psychotic Signs/Symptoms)  Outcome: Met  Goal: Improved Mood Symptoms (Psychotic Signs/Symptoms)  Outcome: Met  Goal: Improved Psychomotor Symptoms (Psychotic Signs/Symptoms)  Outcome: Met  Goal: Decreased Sensory Symptoms (Psychotic Signs/Symptoms)  Outcome: Met  Goal: Improved Sleep (Psychotic Signs/Symptoms)  Outcome: Met  Goal: Enhanced Social, Occupational or Functional Skills (Psychotic Signs/Symptoms)  Outcome: Met     Problem: Excessive Substance Use  Goal: Optimized Energy Level (Excessive Substance Use)  Outcome: Met  Goal: Improved Behavioral Control (Excessive Substance Use)  Outcome: Met  Goal: Increased Participation and Engagement (Excessive Substance Use)  Outcome: Met  Goal: Improved Physiologic Symptoms (Excessive Substance Use)  Outcome: Met  Goal: Enhanced Social, Occupational or Functional Skills (Excessive Substance Use)  Outcome: Met

## 2025-03-27 NOTE — NURSING
Discharge Note:    Chance Lainez is a 44 y.o. male, : 1980, MRN: [id number], admitted on 3/22/2025 for Ramirez Stern MD with a diagnosis of Psychosis [F29].    Patient discharged on 3/27/2025 per physician orders in stable condition. Patient denied suicidal ideation, homicidal ideation, or hallucinations. Patient was discharged with valuables, personal belongings, prescriptions, discharge instructions, printed Warning Sings of Self-Harm pursuant to Act 737 and, an educational handout explaining the diagnosis and prescribed medications. Patient verbalized understanding of the discharge instructions and importance of follow-up visits. Patient was escorted out of the facility by [facility] and placed into a private vehicle to be transported to home.     Patient discharged on the following medications:     Medication List        START taking these medications      nicotine 21 mg/24 hr  Commonly known as: NICODERM CQ  Place 1 patch onto the skin once daily.  Start taking on: 2025            CONTINUE taking these medications      paliperidone 3 MG Tr24  Commonly known as: INVEGA  Take 1 tablet (3 mg total) by mouth once daily.     sertraline 50 MG tablet  Commonly known as: ZOLOFT  Take 1 tablet (50 mg total) by mouth once daily.            STOP taking these medications      folic acid 1 MG tablet  Commonly known as: FOLVITE     hydrOXYzine pamoate 50 MG Cap  Commonly known as: VISTARIL     levETIRAcetam 500 MG Tab  Commonly known as: KEPPRA     nicotine (polacrilex) 2 mg Gum  Commonly known as: NICORETTE     thiamine 100 MG tablet               Where to Get Your Medications        You can get these medications from any pharmacy    Bring a paper prescription for each of these medications  nicotine 21 mg/24 hr  paliperidone 3 MG Tr24  sertraline 50 MG tablet

## 2025-03-27 NOTE — NURSING
At 1013, complaints of severe anxiety.  Atarax 50 mg., administered.  At 1113. Verbalizes mild anxiety.

## 2025-03-27 NOTE — DISCHARGE SUMMARY
"DISCHARGE SUMMARY  PSYCHIATRY      Admit Date: 3/22/2025  4:51 PM    Discharge Date:  3/27/2025    SITE:   [facility]    Discharge Attending Physician: Ramirez Stern M.D.    Chief Complaint:  Paranoid Delusions      History of Present Illness On Admit:   Chance Lainez is a 44 y.o. male placed under a Physician's Emergency Certificate at [facility] after being found sitting in his parked car in the roadway for several hours. Pt admitted to EMS that he used Kratom and ETOH. Pt's mother reported to EMS that pt has hx of schizophrenia, paranoia and states that he always feels people are after him. Pt admits to using ETOH, Meth, and Kratom prior to this event. Prior to using he reports that he was 6 months sober - "I have no idea why I relapsed". He reports an improvement in his hallucinations, however, he continues with paranoid delusions - "I just struggle with thinking people are out to get me". He denies suicidal and homicidal ideations at this time. He is very guarded and not able to explain what exactly is bothering him. Will admit to inpatient unit for medication management and monitor for safety and security.       Admit Mental Status Exam:  General Appearance: appears stated age, well developed and nourished, adequately groomed and appropriately dressed, in no acute distress  Arousal: alert  Behavior: reluctant to participate, minimal responses  Movements and Motor Activity: no abnormal involuntary movements noted; no tics, no tremors, no akathisia, no dystonia, no evidence of tardive dyskinesia; no psychomotor agitation or retardation  Orientation: oriented to person, place, and time  Speech: soft, monotone  Mood: Depressed and Guarded  Affect: flat, constricted  Thought Process: circumstantial  Associations: no loosening of associations  Thought Content and Perceptions: no suicidal ideation, no homicidal ideation, + auditory " "hallucinations, + paranoid ideation, + persecutory delusions, no visual hallucinations  Recent and Remote Memory: intact; per interview/observation with patient  Attention and Concentration: intact; per interview/observation with patient  Fund of Knowledge: aware of current events, vocabulary appropriate; based on history, vocabulary, fund of knowledge, syntax, grammar, and content  Insight: questionable; based on understanding of severity of illness and HPI  Judgment: questionable; based on patient's behavior and HPI      Diagnoses:  PRINCIPAL PROBLEM:  Schizoaffective disorder      PROBLEM LIST    Schizoaffective disorder    Generalized anxiety disorder    Alcohol abuse, continuous    Opioid abuse, continuous        Hospital Course:   03/24  Patient states that he has been "picking up some old habits."  Patient states that he had been staying at a [facility] facility and left after 6 months.  Prior to that, he was on Invega and Zoloft.  However, he states that that they did not want him on any psychiatric medication.  Invega restarted on admission.  Will restart Zoloft.    03/25  Today patient states that he is feeling OK. Staff report significant improvement in mood and affect. Tolerating medications well without issue. Denies any AVH. Patient does report some slight dizziness today but does not indicate that it is intolerable. Will monitor this closely. Will continue current POC and monitor for need to augment.     03/26  Paranoid per staff.  However, less so today and brighter affect.  Mood improved.  Will increase Invega today to 6mg daily and will monitor progress.  Discussed discharge and aftercare.  I do not believe that the [facility] facility would be the best discharge plan since he would have to discontinue his medications.  Will f/u with this.    03/27  Today patient presented somewhat anxious but states that overall he is feeling better. Stated that the increased dose of Invega caused him some anxiety " "last time he took it and feels the 3 mg dose was more appropriate and did not cause him any side effects. I will lower this back to 3 mg and proceed with discharge home today. Patient denies any SI/HI.       Current Medications:   Scheduled Meds:    nicotine  1 patch Transdermal Daily    [START ON 3/28/2025] paliperidone  3 mg Oral Daily    sertraline  50 mg Oral Daily          DISCHARGE EXAMINATION    VITALS   Vitals:    03/25/25 1100 03/25/25 1745 03/26/25 0701 03/27/25 0701   BP: 117/81 118/83 103/68 110/73   Pulse: 99  95 86   Resp: 18  18 20   Temp: 98.6 °F (37 °C)  99.8 °F (37.7 °C) 97.9 °F (36.6 °C)   TempSrc:       SpO2: 98% 98% 96% 96%   Weight:       Height:             Discharge Mental Status Exam:  General Appearance: appears stated age, well-developed, well-nourished  Arousal: alert  Behavior: cooperative  Movements and Motor Activity: no abnormal involuntary movements noted  Orientation: oriented to person, place, time, and situation  Speech: normal rate, normal rhythm, normal volume, normal tone  Mood: "Better"  Affect: constricted  Thought Process: linear  Associations: intact  Thought Content and Perceptions: denies paranoid ideation, no suicidal ideation, no homicidal ideation  Recent and Remote Memory: recent memory intact, remote memory intact; per interview/observation with patient  Attention and Concentration: intact, attentive to conversation; per interview/observation with patient  Fund of Knowledge: intact, aware of current events, vocabulary appropriate; based on history, vocabulary, fund of knowledge, syntax, grammar, and content  Insight: questionable; based on understanding of severity of illness and HPI  Judgment: questionable; based on patient's behavior and HPI      Discharge Condition:  Stable    Prognosis:  Fair    Justification for multiple antipsychotics:  N/a    Disposition:  discharged to home    Follow-up:     Follow-up Information       Clinic, [facility] Follow up. "    Specialties: Behavioral Health, Psychiatry, Psychology  Why: Pt will utilize same day services for first time patients. Monday or Wednesday @ 1pm-2pm Tuesday or Friday @ 8am-10am  Bring insurance card, id, social security card and proof of income   Follow-up within 7 days of discharge  Contact information:  [address] [facility] [address]  [phone]               Smoking Cessation Clinic Follow up.    Why: Pt is not interested in smoking cessation clinic  Contact information:  [phone]                           Medication Regimen:  Current Medications[1]      Patient Instructions:   Continue medication regimen as prescribed.    Disposition plan per  - see  notes for details.    Patient instructed to call 911 or present to emergency department if any of the following complications develop status post discharge: suicidality, homicidality, or grave disability.     Total time spent discharging patient: <30 minutes      ADALI Cheema        [1]   Current Facility-Administered Medications:     acetaminophen tablet 650 mg, 650 mg, Oral, Q6H PRN, Ramirez Stern MD, 650 mg at 03/25/25 1140    aluminum-magnesium hydroxide-simethicone 200-200-20 mg/5 mL suspension 30 mL, 30 mL, Oral, Q6H PRN, Ramirez Stern MD    cloNIDine tablet 0.1 mg, 0.1 mg, Oral, Q8H PRN, Isac Monzon MD, 0.1 mg at 03/23/25 1639    cloNIDine tablet 0.2 mg, 0.2 mg, Oral, Q8H PRN, Isac Monzon MD    haloperidoL tablet 5 mg, 5 mg, Oral, Q6H PRN, 5 mg at 03/23/25 1915 **AND** diphenhydrAMINE capsule 50 mg, 50 mg, Oral, Q6H PRN, 50 mg at 03/23/25 1915 **AND** LORazepam tablet 2 mg, 2 mg, Oral, Q6H PRN, 2 mg at 03/23/25 1915 **AND** haloperidol lactate injection 5 mg, 5 mg, Intramuscular, Q6H PRN **AND** diphenhydrAMINE injection 50 mg, 50 mg, Intramuscular, Q6H PRN **AND** LORazepam injection 2 mg, 2 mg, Intramuscular, Q6H PRN, Ramirez Stern MD    hydrOXYzine  tablet 50 mg, 50 mg, Oral, Q4H PRN, Ramirez Stern MD, 50 mg at 03/27/25 1013    magnesium hydroxide 400 mg/5 ml suspension 2,400 mg, 30 mL, Oral, Daily PRN, Ramirez Stern MD    nicotine 21 mg/24 hr 1 patch, 1 patch, Transdermal, Daily, Ramirez Stern MD, 1 patch at 03/25/25 0823    ondansetron disintegrating tablet 4 mg, 4 mg, Oral, Q6H PRN, Ramirez Stern MD    [START ON 3/28/2025] paliperidone 24 hr tablet 3 mg, 3 mg, Oral, Daily, Erik Rivas, PMP    sertraline tablet 50 mg, 50 mg, Oral, Daily, Ramirez Stern MD, 50 mg at 03/27/25 0822    traZODone tablet 100 mg, 100 mg, Oral, Nightly PRN, Ramirez Stern MD, 100 mg at 03/24/25 2036

## 2025-06-12 ENCOUNTER — HOSPITAL ENCOUNTER (EMERGENCY)
Facility: HOSPITAL | Age: 45
Discharge: PSYCHIATRIC OR SUBSTANCE USE FACILITY | End: 2025-06-12
Attending: INTERNAL MEDICINE
Payer: MEDICAID

## 2025-06-12 VITALS
DIASTOLIC BLOOD PRESSURE: 82 MMHG | SYSTOLIC BLOOD PRESSURE: 118 MMHG | WEIGHT: 220 LBS | HEART RATE: 85 BPM | TEMPERATURE: 98 F | HEIGHT: 72 IN | BODY MASS INDEX: 29.8 KG/M2 | RESPIRATION RATE: 20 BRPM | OXYGEN SATURATION: 97 %

## 2025-06-12 DIAGNOSIS — F25.1 SCHIZOAFFECTIVE DISORDER, DEPRESSIVE TYPE: ICD-10-CM

## 2025-06-12 DIAGNOSIS — T69.029A TRENCH FOOT, UNSPECIFIED LATERALITY, INITIAL ENCOUNTER: Primary | ICD-10-CM

## 2025-06-12 DIAGNOSIS — Z00.8 MEDICAL CLEARANCE FOR PSYCHIATRIC ADMISSION: ICD-10-CM

## 2025-06-12 LAB
ACCEPTIBLE SP GR UR QL: 1.03 (ref 1–1.03)
ALBUMIN SERPL-MCNC: 4.5 G/DL (ref 3.5–5)
ALBUMIN/GLOB SERPL: 1.4 RATIO (ref 1.1–2)
ALP SERPL-CCNC: 87 UNIT/L (ref 40–150)
ALT SERPL-CCNC: 27 UNIT/L (ref 0–55)
AMPHET UR QL SCN: POSITIVE
ANION GAP SERPL CALC-SCNC: 13 MEQ/L
AST SERPL-CCNC: 39 UNIT/L (ref 11–45)
BACTERIA #/AREA URNS AUTO: ABNORMAL /HPF
BARBITURATE SCN PRESENT UR: NEGATIVE
BASOPHILS # BLD AUTO: 0.06 X10(3)/MCL
BASOPHILS NFR BLD AUTO: 0.5 %
BENZODIAZ UR QL SCN: NEGATIVE
BILIRUB SERPL-MCNC: 2.5 MG/DL
BILIRUB UR QL STRIP.AUTO: NEGATIVE
BUN SERPL-MCNC: 17.7 MG/DL (ref 8.9–20.6)
CALCIUM SERPL-MCNC: 9.7 MG/DL (ref 8.4–10.2)
CANNABINOIDS UR QL SCN: NEGATIVE
CHLORIDE SERPL-SCNC: 101 MMOL/L (ref 98–107)
CLARITY UR: CLEAR
CO2 SERPL-SCNC: 24 MMOL/L (ref 22–29)
COCAINE UR QL SCN: POSITIVE
COLOR UR AUTO: YELLOW
CREAT SERPL-MCNC: 0.95 MG/DL (ref 0.72–1.25)
CREAT/UREA NIT SERPL: 19
EOSINOPHIL # BLD AUTO: 0.1 X10(3)/MCL (ref 0–0.9)
EOSINOPHIL NFR BLD AUTO: 0.8 %
ERYTHROCYTE [DISTWIDTH] IN BLOOD BY AUTOMATED COUNT: 12.9 % (ref 11.5–17)
ETHANOL SERPL-MCNC: <10 MG/DL
FENTANYL UR QL SCN: POSITIVE
GFR SERPLBLD CREATININE-BSD FMLA CKD-EPI: >60 ML/MIN/1.73/M2
GLOBULIN SER-MCNC: 3.3 GM/DL (ref 2.4–3.5)
GLUCOSE SERPL-MCNC: 79 MG/DL (ref 74–100)
GLUCOSE UR QL STRIP: NORMAL
HCT VFR BLD AUTO: 39.8 % (ref 42–52)
HGB BLD-MCNC: 13.5 G/DL (ref 14–18)
HGB UR QL STRIP: NEGATIVE
HYALINE CASTS #/AREA URNS LPF: ABNORMAL /LPF
IMM GRANULOCYTES # BLD AUTO: 0.04 X10(3)/MCL (ref 0–0.04)
IMM GRANULOCYTES NFR BLD AUTO: 0.3 %
KETONES UR QL STRIP: ABNORMAL
LEUKOCYTE ESTERASE UR QL STRIP: NEGATIVE
LYMPHOCYTES # BLD AUTO: 1.6 X10(3)/MCL (ref 0.6–4.6)
LYMPHOCYTES NFR BLD AUTO: 13.2 %
MCH RBC QN AUTO: 30.3 PG (ref 27–31)
MCHC RBC AUTO-ENTMCNC: 33.9 G/DL (ref 33–36)
MCV RBC AUTO: 89.2 FL (ref 80–94)
MDMA UR QL SCN: NEGATIVE
MONOCYTES # BLD AUTO: 0.82 X10(3)/MCL (ref 0.1–1.3)
MONOCYTES NFR BLD AUTO: 6.8 %
MUCOUS THREADS URNS QL MICRO: ABNORMAL /LPF
NEUTROPHILS # BLD AUTO: 9.47 X10(3)/MCL (ref 2.1–9.2)
NEUTROPHILS NFR BLD AUTO: 78.4 %
NITRITE UR QL STRIP: NEGATIVE
NRBC BLD AUTO-RTO: 0 %
OHS QRS DURATION: 78 MS
OHS QTC CALCULATION: 459 MS
OPIATES UR QL SCN: NEGATIVE
PCP UR QL: NEGATIVE
PH UR STRIP: 6 [PH]
PH UR: 6 [PH] (ref 3–11)
PLATELET # BLD AUTO: 241 X10(3)/MCL (ref 130–400)
PMV BLD AUTO: 10.7 FL (ref 7.4–10.4)
POTASSIUM SERPL-SCNC: 4.3 MMOL/L (ref 3.5–5.1)
PROT SERPL-MCNC: 7.8 GM/DL (ref 6.4–8.3)
PROT UR QL STRIP: ABNORMAL
RBC # BLD AUTO: 4.46 X10(6)/MCL (ref 4.7–6.1)
RBC #/AREA URNS AUTO: ABNORMAL /HPF
SODIUM SERPL-SCNC: 138 MMOL/L (ref 136–145)
SP GR UR STRIP.AUTO: 1.03 (ref 1–1.03)
SQUAMOUS #/AREA URNS LPF: ABNORMAL /HPF
TSH SERPL-ACNC: 0.98 UIU/ML (ref 0.35–4.94)
UROBILINOGEN UR STRIP-ACNC: ABNORMAL
WBC # BLD AUTO: 12.09 X10(3)/MCL (ref 4.5–11.5)
WBC #/AREA URNS AUTO: ABNORMAL /HPF

## 2025-06-12 PROCEDURE — 63600175 PHARM REV CODE 636 W HCPCS: Performed by: EMERGENCY MEDICINE

## 2025-06-12 PROCEDURE — 63600175 PHARM REV CODE 636 W HCPCS: Performed by: INTERNAL MEDICINE

## 2025-06-12 PROCEDURE — 81003 URINALYSIS AUTO W/O SCOPE: CPT | Performed by: INTERNAL MEDICINE

## 2025-06-12 PROCEDURE — 82077 ASSAY SPEC XCP UR&BREATH IA: CPT | Performed by: INTERNAL MEDICINE

## 2025-06-12 PROCEDURE — 96372 THER/PROPH/DIAG INJ SC/IM: CPT | Performed by: EMERGENCY MEDICINE

## 2025-06-12 PROCEDURE — 96372 THER/PROPH/DIAG INJ SC/IM: CPT | Performed by: INTERNAL MEDICINE

## 2025-06-12 PROCEDURE — 99285 EMERGENCY DEPT VISIT HI MDM: CPT | Mod: 25

## 2025-06-12 PROCEDURE — S4991 NICOTINE PATCH NONLEGEND: HCPCS | Performed by: INTERNAL MEDICINE

## 2025-06-12 PROCEDURE — 80053 COMPREHEN METABOLIC PANEL: CPT | Performed by: INTERNAL MEDICINE

## 2025-06-12 PROCEDURE — 90471 IMMUNIZATION ADMIN: CPT | Performed by: INTERNAL MEDICINE

## 2025-06-12 PROCEDURE — 90715 TDAP VACCINE 7 YRS/> IM: CPT | Performed by: INTERNAL MEDICINE

## 2025-06-12 PROCEDURE — 85025 COMPLETE CBC W/AUTO DIFF WBC: CPT | Performed by: INTERNAL MEDICINE

## 2025-06-12 PROCEDURE — 93005 ELECTROCARDIOGRAM TRACING: CPT

## 2025-06-12 PROCEDURE — 84443 ASSAY THYROID STIM HORMONE: CPT | Performed by: EMERGENCY MEDICINE

## 2025-06-12 PROCEDURE — 25000003 PHARM REV CODE 250: Performed by: INTERNAL MEDICINE

## 2025-06-12 PROCEDURE — 80307 DRUG TEST PRSMV CHEM ANLYZR: CPT | Performed by: INTERNAL MEDICINE

## 2025-06-12 RX ORDER — LORAZEPAM 2 MG/ML
2 INJECTION INTRAMUSCULAR
Status: COMPLETED | OUTPATIENT
Start: 2025-06-12 | End: 2025-06-12

## 2025-06-12 RX ORDER — IBUPROFEN 200 MG
1 TABLET ORAL DAILY
Status: DISCONTINUED | OUTPATIENT
Start: 2025-06-12 | End: 2025-06-12

## 2025-06-12 RX ADMIN — QUETIAPINE FUMARATE 150 MG: 100 TABLET ORAL at 04:06

## 2025-06-12 RX ADMIN — LORAZEPAM 2 MG: 2 INJECTION INTRAMUSCULAR; INTRAVENOUS at 04:06

## 2025-06-12 RX ADMIN — NICOTINE 1 PATCH: 14 PATCH TRANSDERMAL at 04:06

## 2025-06-12 RX ADMIN — CLOSTRIDIUM TETANI TOXOID ANTIGEN (FORMALDEHYDE INACTIVATED), CORYNEBACTERIUM DIPHTHERIAE TOXOID ANTIGEN (FORMALDEHYDE INACTIVATED), BORDETELLA PERTUSSIS TOXOID ANTIGEN (GLUTARALDEHYDE INACTIVATED), BORDETELLA PERTUSSIS FILAMENTOUS HEMAGGLUTININ ANTIGEN (FORMALDEHYDE INACTIVATED), BORDETELLA PERTUSSIS PERTACTIN ANTIGEN, AND BORDETELLA PERTUSSIS FIMBRIAE 2/3 ANTIGEN 0.5 ML: 5; 2; 2.5; 5; 3; 5 INJECTION, SUSPENSION INTRAMUSCULAR at 05:06

## 2025-06-12 NOTE — ED PROVIDER NOTES
Encounter Date: 6/12/2025       History     Chief Complaint   Patient presents with    Foot Pain    Anxiety     Pt arrives via EMS with bilateral foot pain states his feet has been in his wet shoes for the past two days. Pt also states having anxiety and depression states he has been of of his meds for months and need to get back on them. Pt denies SI/HI or any hallucinations        Presents by EMS due to anxiety and depression. States Hx of schizoaffective disorder, out of his medications for the last 3-4 months. States do not have a psychiatrist and is homeless. States foot pain after using wet shoes for the last 2 days. Last inpatient was at [facility] in April 2025    The history is provided by the patient and the EMS personnel.     Review of patient's allergies indicates:   Allergen Reactions    Haloperidol lactate Hives     Past Medical History:   Diagnosis Date    Bipolar disorder, unspecified     History of psychiatric hospitalization     Schizophrenia, unspecified     Seizures      History reviewed. No pertinent surgical history.  Family History   Problem Relation Name Age of Onset    No Known Problems Mother      No Known Problems Father       Social History[1]  Review of Systems   Skin:  Positive for wound.   Psychiatric/Behavioral:  The patient is nervous/anxious.        Physical Exam     Initial Vitals [06/12/25 0433]   BP Pulse Resp Temp SpO2   (!) 146/81 97 20 98.2 °F (36.8 °C) 100 %      MAP       --         Physical Exam    Nursing note and vitals reviewed.  Constitutional: He appears well-developed and well-nourished. No distress.   HENT:   Head: Normocephalic and atraumatic. Mouth/Throat: Oropharynx is clear and moist.   Eyes: Conjunctivae and EOM are normal. Pupils are equal, round, and reactive to light.   Neck: Neck supple. No thyromegaly present. No JVD present.   Normal range of motion.  Cardiovascular:  Regular rhythm, normal heart sounds and intact distal pulses.           Pulmonary/Chest: Breath  sounds normal. No respiratory distress.   Abdominal: Abdomen is soft. Bowel sounds are normal. He exhibits no distension. There is no abdominal tenderness. There is no rebound and no guarding.   Musculoskeletal:         General: No tenderness or edema. Normal range of motion.      Cervical back: Normal range of motion and neck supple.     Neurological: He is alert and oriented to person, place, and time. GCS score is 15. GCS eye subscore is 4. GCS verbal subscore is 5. GCS motor subscore is 6.   Skin: Skin is warm. No rash noted.   Bilateral feet with blisters among plantar aspect, wet skin, N-V intact   Psychiatric: His speech is normal. Thought content normal. He is slowed and withdrawn. He is not actively hallucinating. Cognition and memory are normal. He expresses inappropriate judgment. He exhibits a depressed mood.   Keep playing with his fingers, compulsive behavior? He is attentive.         ED Course   Procedures  Labs Reviewed   COMPREHENSIVE METABOLIC PANEL - Abnormal       Result Value    Sodium 138      Potassium 4.3      Chloride 101      CO2 24      Glucose 79      Blood Urea Nitrogen 17.7      Creatinine 0.95      Calcium 9.7      Protein Total 7.8      Albumin 4.5      Globulin 3.3      Albumin/Globulin Ratio 1.4      Bilirubin Total 2.5 (*)     ALP 87      ALT 27      AST 39      eGFR >60      Anion Gap 13.0      BUN/Creatinine Ratio 19     URINALYSIS, REFLEX TO URINE CULTURE - Abnormal    Color, UA Yellow      Appearance, UA Clear      Specific Gravity, UA 1.034 (*)     pH, UA 6.0      Protein, UA Trace (*)     Glucose, UA Normal      Ketones, UA 3+ (*)     Blood, UA Negative      Bilirubin, UA Negative      Urobilinogen, UA 1+ (*)     Nitrites, UA Negative      Leukocyte Esterase, UA Negative      RBC, UA 0-5      WBC, UA 0-5      Bacteria, UA None Seen      Squamous Epithelial Cells, UA None Seen      Mucous, UA Few (*)     Hyaline Casts, UA None Seen     DRUG SCREEN, URINE (BEAKER) - Abnormal     Amphetamines, Urine Positive (*)     Barbiturates, Urine Negative      Benzodiazepine, Urine Negative      Cannabinoids, Urine Negative      Cocaine, Urine Positive (*)     Fentanyl, Urine Positive (*)     MDMA, Urine Negative      Opiates, Urine Negative      Phencyclidine, Urine Negative      pH, Urine 6.0      Specific Gravity, Urine Auto 1.034      Narrative:     Cut off concentrations:    Amphetamines - 1000 ng/ml  Barbiturates - 200 ng/ml  Benzodiazepine - 200 ng/ml  Cannabinoids (THC) - 50 ng/ml  Cocaine - 300 ng/ml  Fentanyl - 1.0 ng/ml  MDMA - 500 ng/ml  Opiates - 300 ng/ml   Phencyclidine (PCP) - 25 ng/ml    Specimen submitted for drug analysis and tested for pH and specific gravity in order to evaluate sample integrity. Suspect tampering if specific gravity is <1.003 and/or pH is not within the range of 4.5 - 8.0  False negatives may result form substances such as bleach added to urine.  False positives may result for the presence of a substance with similar chemical structure to the drug or its metabolite.    This test provides only a PRELIMINARY analytical test result. A more specific alternate chemical method must be used in order to obtain a confirmed analytical result. Gas chromatography/mass spectrometry (GC/MS) is the preferred confirmatory method. Other chemical confirmation methods are available. Clinical consideration and professional judgement should be applied to any drug of abuse test result, particularly when preliminary positive results are used.    Positive results will be confirmed only at the physicians request. Unconfirmed screening results are to be used only for medical purposes (treatment).        CBC WITH DIFFERENTIAL - Abnormal    WBC 12.09 (*)     RBC 4.46 (*)     Hgb 13.5 (*)     Hct 39.8 (*)     MCV 89.2      MCH 30.3      MCHC 33.9      RDW 12.9      Platelet 241      MPV 10.7 (*)     Neut % 78.4      Lymph % 13.2      Mono % 6.8      Eos % 0.8      Basophil % 0.5      Imm  Grans % 0.3      Neut # 9.47 (*)     Lymph # 1.60      Mono # 0.82      Eos # 0.10      Baso # 0.06      Imm Gran # 0.04      NRBC% 0.0     ALCOHOL,MEDICAL (ETHANOL) - Normal    Ethanol Level <10.0     TSH - Normal    TSH 0.985     CBC W/ AUTO DIFFERENTIAL    Narrative:     The following orders were created for panel order CBC auto differential.  Procedure                               Abnormality         Status                     ---------                               -----------         ------                     CBC with Differential[[id number]]       Abnormal            Final result                 Please view results for these tests on the individual orders.     EKG Readings: (Independently Interpreted)   Initial Reading: No STEMI. Rhythm: Normal Sinus Rhythm. Heart Rate: 96. Ectopy: No Ectopy. Conduction: Normal. ST Segments: Normal ST Segments. T Waves: Normal. Axis: Normal. Clinical Impression: Normal Sinus Rhythm     ECG Results              EKG 12-lead (Final result)        Collection Time Result Time QRS Duration OHS QTC Calculation    06/12/25 04:40:10 06/12/25 10:14:28 78 459                     Final result by Interface, Lab In [facility] (06/12/25 10:14:30)                   Narrative:    Test Reason : Z00.8,    Vent. Rate :  96 BPM     Atrial Rate :  96 BPM     P-R Int : 156 ms          QRS Dur :  78 ms      QT Int : 364 ms       P-R-T Axes :  69  32  32 degrees    QTcB Int : 459 ms    Normal sinus rhythm  Normal ECG  When compared with ECG of 27-Mar-2025 12:19,  No significant change was found  Confirmed by Tre Fernandez ([id number]) on 6/12/2025 10:14:27 AM    Referred By: AAAREFERRAL SELF           Confirmed By: Tre Fernandez                                     EKG 12-lead (Final result)  Result time 06/17/25 10:53:48      Final result by Unknown User (06/17/25 10:53:48)                                      Imaging Results    None          Medications   Tdap vaccine injection 0.5 mL (0.5 mLs  "Intramuscular Given 6/12/25 0193)   QUEtiapine tablet 150 mg (150 mg Oral Given 6/12/25 7467)   LORazepam injection 2 mg (2 mg Intramuscular Given 6/12/25 1967)     Medical Decision Making  Amount and/or Complexity of Data Reviewed  Labs: ordered.    Risk  OTC drugs.  Prescription drug management.                          Medical Decision Making:   ED Management:  Patient medically cleared awaiting psychiatric placement for voluntary admission             Clinical Impression:  Final diagnoses:  [Z00.8] Medical clearance for psychiatric admission  [T69.029A] Trench foot, unspecified laterality, initial encounter (Primary)  [F25.1] Schizoaffective disorder, depressive type          ED Disposition Condition    Transfer to Another Facility                         [1]   Social History  Tobacco Use    Smoking status: Every Day     Current packs/day: 1.00     Average packs/day: 1 pack/day for 26.5 years (26.5 ttl pk-yrs)     Types: Cigarettes     Start date: 1999    Smokeless tobacco: Never   Substance Use Topics    Alcohol use: Yes     Comment: Yaya daily    Drug use: Yes     Types: Methamphetamines, Fentanyl     Comment: "KRATOM"        Ayad Cerda MD  06/19/25 1308    "

## 2025-08-09 ENCOUNTER — HOSPITAL ENCOUNTER (EMERGENCY)
Facility: HOSPITAL | Age: 45
Discharge: LEFT BEFORE DISCHARGE | End: 2025-08-09
Attending: FAMILY MEDICINE
Payer: MEDICAID

## 2025-08-09 VITALS
TEMPERATURE: 98 F | OXYGEN SATURATION: 98 % | WEIGHT: 200 LBS | HEART RATE: 107 BPM | BODY MASS INDEX: 27.09 KG/M2 | RESPIRATION RATE: 20 BRPM | HEIGHT: 72 IN | DIASTOLIC BLOOD PRESSURE: 97 MMHG | SYSTOLIC BLOOD PRESSURE: 149 MMHG

## 2025-08-09 DIAGNOSIS — F15.10 METHAMPHETAMINE ABUSE: Primary | ICD-10-CM

## 2025-08-09 DIAGNOSIS — E87.6 HYPOKALEMIA: ICD-10-CM

## 2025-08-09 LAB
ACCEPTIBLE SP GR UR QL: 1.01 (ref 1–1.03)
ALBUMIN SERPL-MCNC: 4.4 G/DL (ref 3.5–5)
ALBUMIN/GLOB SERPL: 1.2 RATIO (ref 1.1–2)
ALP SERPL-CCNC: 67 UNIT/L (ref 40–150)
ALT SERPL-CCNC: 106 UNIT/L (ref 0–55)
AMPHET UR QL SCN: POSITIVE
ANION GAP SERPL CALC-SCNC: 12 MEQ/L
AST SERPL-CCNC: 197 UNIT/L (ref 11–45)
BARBITURATE SCN PRESENT UR: NEGATIVE
BASOPHILS # BLD AUTO: 0.05 X10(3)/MCL
BASOPHILS NFR BLD AUTO: 0.6 %
BENZODIAZ UR QL SCN: NEGATIVE
BILIRUB SERPL-MCNC: 2.1 MG/DL
BILIRUB UR QL STRIP.AUTO: NEGATIVE
BUN SERPL-MCNC: 17.6 MG/DL (ref 8.9–20.6)
CALCIUM SERPL-MCNC: 9 MG/DL (ref 8.4–10.2)
CANNABINOIDS UR QL SCN: NEGATIVE
CHLORIDE SERPL-SCNC: 96 MMOL/L (ref 98–107)
CLARITY UR: CLEAR
CO2 SERPL-SCNC: 25 MMOL/L (ref 22–29)
COCAINE UR QL SCN: NEGATIVE
COLOR UR AUTO: NORMAL
CREAT SERPL-MCNC: 0.95 MG/DL (ref 0.72–1.25)
CREAT/UREA NIT SERPL: 19
EOSINOPHIL # BLD AUTO: 0.19 X10(3)/MCL (ref 0–0.9)
EOSINOPHIL NFR BLD AUTO: 2.3 %
ERYTHROCYTE [DISTWIDTH] IN BLOOD BY AUTOMATED COUNT: 12.2 % (ref 11.5–17)
ETHANOL SERPL-MCNC: <10 MG/DL
FENTANYL UR QL SCN: POSITIVE
GFR SERPLBLD CREATININE-BSD FMLA CKD-EPI: >60 ML/MIN/1.73/M2
GLOBULIN SER-MCNC: 3.7 GM/DL (ref 2.4–3.5)
GLUCOSE SERPL-MCNC: 92 MG/DL (ref 74–100)
GLUCOSE UR QL STRIP: NEGATIVE
HCT VFR BLD AUTO: 38.9 % (ref 42–52)
HGB BLD-MCNC: 14 G/DL (ref 14–18)
HGB UR QL STRIP: NEGATIVE
IMM GRANULOCYTES # BLD AUTO: 0.01 X10(3)/MCL (ref 0–0.04)
IMM GRANULOCYTES NFR BLD AUTO: 0.1 %
KETONES UR QL STRIP: NEGATIVE
LEUKOCYTE ESTERASE UR QL STRIP: NEGATIVE
LYMPHOCYTES # BLD AUTO: 2.26 X10(3)/MCL (ref 0.6–4.6)
LYMPHOCYTES NFR BLD AUTO: 27.2 %
MCH RBC QN AUTO: 29.9 PG (ref 27–31)
MCHC RBC AUTO-ENTMCNC: 36 G/DL (ref 33–36)
MCV RBC AUTO: 83.1 FL (ref 80–94)
MDMA UR QL SCN: NEGATIVE
MONOCYTES # BLD AUTO: 0.79 X10(3)/MCL (ref 0.1–1.3)
MONOCYTES NFR BLD AUTO: 9.5 %
NEUTROPHILS # BLD AUTO: 5.02 X10(3)/MCL (ref 2.1–9.2)
NEUTROPHILS NFR BLD AUTO: 60.3 %
NITRITE UR QL STRIP: NEGATIVE
NRBC BLD AUTO-RTO: 0 %
OPIATES UR QL SCN: NEGATIVE
PCP UR QL: NEGATIVE
PH UR STRIP: 6 [PH]
PH UR: 6 [PH] (ref 3–11)
PLATELET # BLD AUTO: 261 X10(3)/MCL (ref 130–400)
PMV BLD AUTO: 10.6 FL (ref 7.4–10.4)
POTASSIUM SERPL-SCNC: 3.1 MMOL/L (ref 3.5–5.1)
PROT SERPL-MCNC: 8.1 GM/DL (ref 6.4–8.3)
PROT UR QL STRIP: NEGATIVE
RBC # BLD AUTO: 4.68 X10(6)/MCL (ref 4.7–6.1)
SODIUM SERPL-SCNC: 133 MMOL/L (ref 136–145)
SP GR UR STRIP.AUTO: 1.01 (ref 1–1.03)
UROBILINOGEN UR STRIP-ACNC: 0.2
WBC # BLD AUTO: 8.32 X10(3)/MCL (ref 4.5–11.5)

## 2025-08-09 PROCEDURE — 81003 URINALYSIS AUTO W/O SCOPE: CPT | Mod: 59 | Performed by: FAMILY MEDICINE

## 2025-08-09 PROCEDURE — 99283 EMERGENCY DEPT VISIT LOW MDM: CPT

## 2025-08-09 PROCEDURE — 80307 DRUG TEST PRSMV CHEM ANLYZR: CPT | Performed by: FAMILY MEDICINE

## 2025-08-09 PROCEDURE — 25000003 PHARM REV CODE 250: Performed by: FAMILY MEDICINE

## 2025-08-09 PROCEDURE — 82077 ASSAY SPEC XCP UR&BREATH IA: CPT | Performed by: FAMILY MEDICINE

## 2025-08-09 PROCEDURE — 80053 COMPREHEN METABOLIC PANEL: CPT | Performed by: FAMILY MEDICINE

## 2025-08-09 PROCEDURE — 85025 COMPLETE CBC W/AUTO DIFF WBC: CPT | Performed by: FAMILY MEDICINE

## 2025-08-09 RX ORDER — POTASSIUM CHLORIDE 20 MEQ/1
40 TABLET, EXTENDED RELEASE ORAL
Status: COMPLETED | OUTPATIENT
Start: 2025-08-09 | End: 2025-08-09

## 2025-08-09 RX ADMIN — POTASSIUM CHLORIDE 40 MEQ: 20 TABLET, EXTENDED RELEASE ORAL at 03:08

## 2025-08-10 ENCOUNTER — HOSPITAL ENCOUNTER (EMERGENCY)
Facility: HOSPITAL | Age: 45
Discharge: HOME | End: 2025-08-10
Attending: EMERGENCY MEDICINE
Payer: MEDICAID

## 2025-08-10 VITALS
RESPIRATION RATE: 18 BRPM | HEIGHT: 72 IN | TEMPERATURE: 98 F | HEART RATE: 115 BPM | DIASTOLIC BLOOD PRESSURE: 90 MMHG | BODY MASS INDEX: 27.09 KG/M2 | SYSTOLIC BLOOD PRESSURE: 145 MMHG | OXYGEN SATURATION: 100 % | WEIGHT: 200 LBS

## 2025-08-10 DIAGNOSIS — F19.90 ILLICIT DRUG USE: Primary | ICD-10-CM

## 2025-08-10 PROCEDURE — 99284 EMERGENCY DEPT VISIT MOD MDM: CPT | Mod: 25

## 2025-08-10 PROCEDURE — 96372 THER/PROPH/DIAG INJ SC/IM: CPT | Performed by: EMERGENCY MEDICINE

## 2025-08-10 PROCEDURE — 63600175 PHARM REV CODE 636 W HCPCS: Performed by: EMERGENCY MEDICINE

## 2025-08-10 RX ORDER — ZIPRASIDONE MESYLATE 20 MG/ML
20 INJECTION, POWDER, LYOPHILIZED, FOR SOLUTION INTRAMUSCULAR
Status: COMPLETED | OUTPATIENT
Start: 2025-08-10 | End: 2025-08-10

## 2025-08-10 RX ADMIN — ZIPRASIDONE MESYLATE 20 MG: 20 INJECTION, POWDER, LYOPHILIZED, FOR SOLUTION INTRAMUSCULAR at 02:08

## 2025-08-16 ENCOUNTER — HOSPITAL ENCOUNTER (EMERGENCY)
Facility: HOSPITAL | Age: 45
Discharge: PSYCHIATRIC OR SUBSTANCE USE FACILITY | End: 2025-08-16
Attending: EMERGENCY MEDICINE
Payer: MEDICAID

## 2025-08-16 ENCOUNTER — HOSPITAL ENCOUNTER (EMERGENCY)
Facility: HOSPITAL | Age: 45
Discharge: HOME | End: 2025-08-16
Attending: INTERNAL MEDICINE
Payer: MEDICAID

## 2025-08-16 VITALS
TEMPERATURE: 98 F | RESPIRATION RATE: 20 BRPM | BODY MASS INDEX: 27.63 KG/M2 | SYSTOLIC BLOOD PRESSURE: 160 MMHG | OXYGEN SATURATION: 98 % | DIASTOLIC BLOOD PRESSURE: 89 MMHG | WEIGHT: 204 LBS | HEART RATE: 105 BPM | HEIGHT: 72 IN

## 2025-08-16 VITALS
HEIGHT: 72 IN | DIASTOLIC BLOOD PRESSURE: 93 MMHG | BODY MASS INDEX: 29.8 KG/M2 | SYSTOLIC BLOOD PRESSURE: 146 MMHG | RESPIRATION RATE: 20 BRPM | WEIGHT: 220 LBS | HEART RATE: 102 BPM | OXYGEN SATURATION: 97 % | TEMPERATURE: 98 F

## 2025-08-16 DIAGNOSIS — F29 PSYCHOSIS, UNSPECIFIED PSYCHOSIS TYPE: Primary | ICD-10-CM

## 2025-08-16 DIAGNOSIS — Z00.8 MEDICAL CLEARANCE FOR PSYCHIATRIC ADMISSION: ICD-10-CM

## 2025-08-16 DIAGNOSIS — F20.9 SCHIZOPHRENIA, UNSPECIFIED TYPE: Primary | ICD-10-CM

## 2025-08-16 DIAGNOSIS — F19.10 SUBSTANCE ABUSE: ICD-10-CM

## 2025-08-16 DIAGNOSIS — Z76.0 MEDICATION REFILL: ICD-10-CM

## 2025-08-16 LAB
ACCEPTIBLE SP GR UR QL: 1.03 (ref 1–1.03)
ALBUMIN SERPL-MCNC: 4.3 G/DL (ref 3.5–5)
ALBUMIN/GLOB SERPL: 1.3 RATIO (ref 1.1–2)
ALP SERPL-CCNC: 51 UNIT/L (ref 40–150)
ALT SERPL-CCNC: 26 UNIT/L (ref 0–55)
AMORPH URATE CRY URNS QL MICRO: ABNORMAL /UL
AMPHET UR QL SCN: POSITIVE
ANION GAP SERPL CALC-SCNC: 14 MEQ/L
AST SERPL-CCNC: 26 UNIT/L (ref 11–45)
BACTERIA #/AREA URNS AUTO: ABNORMAL /HPF
BARBITURATE SCN PRESENT UR: NEGATIVE
BASOPHILS # BLD AUTO: 0.03 X10(3)/MCL
BASOPHILS NFR BLD AUTO: 0.3 %
BENZODIAZ UR QL SCN: NEGATIVE
BILIRUB SERPL-MCNC: 1.4 MG/DL
BILIRUB UR QL STRIP.AUTO: NEGATIVE
BUN SERPL-MCNC: 19.9 MG/DL (ref 8.9–20.6)
CALCIUM SERPL-MCNC: 9.2 MG/DL (ref 8.4–10.2)
CANNABINOIDS UR QL SCN: NEGATIVE
CHLORIDE SERPL-SCNC: 105 MMOL/L (ref 98–107)
CLARITY UR: ABNORMAL
CO2 SERPL-SCNC: 20 MMOL/L (ref 22–29)
COCAINE UR QL SCN: NEGATIVE
COLOR UR AUTO: YELLOW
CREAT SERPL-MCNC: 1.14 MG/DL (ref 0.72–1.25)
CREAT/UREA NIT SERPL: 17
EOSINOPHIL # BLD AUTO: 0.07 X10(3)/MCL (ref 0–0.9)
EOSINOPHIL NFR BLD AUTO: 0.7 %
ERYTHROCYTE [DISTWIDTH] IN BLOOD BY AUTOMATED COUNT: 13.2 % (ref 11.5–17)
ETHANOL SERPL-MCNC: 37 MG/DL
FENTANYL UR QL SCN: POSITIVE
GFR SERPLBLD CREATININE-BSD FMLA CKD-EPI: >60 ML/MIN/1.73/M2
GLOBULIN SER-MCNC: 3.4 GM/DL (ref 2.4–3.5)
GLUCOSE SERPL-MCNC: 84 MG/DL (ref 74–100)
GLUCOSE UR QL STRIP: NORMAL
HCT VFR BLD AUTO: 37.4 % (ref 42–52)
HGB BLD-MCNC: 12.7 G/DL (ref 14–18)
HGB UR QL STRIP: NEGATIVE
IMM GRANULOCYTES # BLD AUTO: 0.02 X10(3)/MCL (ref 0–0.04)
IMM GRANULOCYTES NFR BLD AUTO: 0.2 %
KETONES UR QL STRIP: NEGATIVE
LEUKOCYTE ESTERASE UR QL STRIP: NEGATIVE
LYMPHOCYTES # BLD AUTO: 1.77 X10(3)/MCL (ref 0.6–4.6)
LYMPHOCYTES NFR BLD AUTO: 16.8 %
MCH RBC QN AUTO: 29.6 PG (ref 27–31)
MCHC RBC AUTO-ENTMCNC: 34 G/DL (ref 33–36)
MCV RBC AUTO: 87.2 FL (ref 80–94)
MDMA UR QL SCN: NEGATIVE
MONOCYTES # BLD AUTO: 0.8 X10(3)/MCL (ref 0.1–1.3)
MONOCYTES NFR BLD AUTO: 7.6 %
MUCOUS THREADS URNS QL MICRO: ABNORMAL /LPF
NEUTROPHILS # BLD AUTO: 7.83 X10(3)/MCL (ref 2.1–9.2)
NEUTROPHILS NFR BLD AUTO: 74.4 %
NITRITE UR QL STRIP: NEGATIVE
NRBC BLD AUTO-RTO: 0 %
OPIATES UR QL SCN: NEGATIVE
PCP UR QL: NEGATIVE
PH UR STRIP: 5 [PH]
PH UR: 5 [PH] (ref 3–11)
PLATELET # BLD AUTO: 248 X10(3)/MCL (ref 130–400)
PMV BLD AUTO: 9.9 FL (ref 7.4–10.4)
POTASSIUM SERPL-SCNC: 3.8 MMOL/L (ref 3.5–5.1)
PROT SERPL-MCNC: 7.7 GM/DL (ref 6.4–8.3)
PROT UR QL STRIP: ABNORMAL
RBC # BLD AUTO: 4.29 X10(6)/MCL (ref 4.7–6.1)
RBC #/AREA URNS AUTO: ABNORMAL /HPF
SODIUM SERPL-SCNC: 139 MMOL/L (ref 136–145)
SP GR UR STRIP.AUTO: 1.03 (ref 1–1.03)
SQUAMOUS #/AREA URNS LPF: ABNORMAL /HPF
UROBILINOGEN UR STRIP-ACNC: NORMAL
WBC # BLD AUTO: 10.52 X10(3)/MCL (ref 4.5–11.5)
WBC #/AREA URNS AUTO: ABNORMAL /HPF

## 2025-08-16 PROCEDURE — 80307 DRUG TEST PRSMV CHEM ANLYZR: CPT | Performed by: NURSE PRACTITIONER

## 2025-08-16 PROCEDURE — 25000003 PHARM REV CODE 250: Performed by: EMERGENCY MEDICINE

## 2025-08-16 PROCEDURE — 85025 COMPLETE CBC W/AUTO DIFF WBC: CPT | Performed by: NURSE PRACTITIONER

## 2025-08-16 PROCEDURE — 80053 COMPREHEN METABOLIC PANEL: CPT | Performed by: NURSE PRACTITIONER

## 2025-08-16 PROCEDURE — 99285 EMERGENCY DEPT VISIT HI MDM: CPT | Mod: 27

## 2025-08-16 PROCEDURE — 81015 MICROSCOPIC EXAM OF URINE: CPT | Performed by: NURSE PRACTITIONER

## 2025-08-16 PROCEDURE — 82077 ASSAY SPEC XCP UR&BREATH IA: CPT | Performed by: EMERGENCY MEDICINE

## 2025-08-16 PROCEDURE — 99281 EMR DPT VST MAYX REQ PHY/QHP: CPT

## 2025-08-16 RX ORDER — ZIPRASIDONE MESYLATE 20 MG/ML
20 INJECTION, POWDER, LYOPHILIZED, FOR SOLUTION INTRAMUSCULAR EVERY 8 HOURS PRN
Status: DISCONTINUED | OUTPATIENT
Start: 2025-08-16 | End: 2025-08-16 | Stop reason: HOSPADM

## 2025-08-16 RX ORDER — ZIPRASIDONE HYDROCHLORIDE 40 MG/1
40 CAPSULE ORAL
Status: DISCONTINUED | OUTPATIENT
Start: 2025-08-16 | End: 2025-08-16 | Stop reason: HOSPADM

## 2025-08-16 RX ORDER — ZIPRASIDONE MESYLATE 20 MG/ML
20 INJECTION, POWDER, LYOPHILIZED, FOR SOLUTION INTRAMUSCULAR
Status: DISCONTINUED | OUTPATIENT
Start: 2025-08-16 | End: 2025-08-16

## 2025-08-16 RX ORDER — ZIPRASIDONE HYDROCHLORIDE 20 MG/1
20 CAPSULE ORAL 2 TIMES DAILY
Qty: 60 CAPSULE | Refills: 0 | Status: SHIPPED | OUTPATIENT
Start: 2025-08-16 | End: 2025-09-15

## 2025-08-16 RX ORDER — HYDROXYZINE PAMOATE 50 MG/1
50 CAPSULE ORAL DAILY
COMMUNITY

## 2025-08-16 RX ORDER — LORAZEPAM 1 MG/1
2 TABLET ORAL EVERY 4 HOURS PRN
Status: DISCONTINUED | OUTPATIENT
Start: 2025-08-16 | End: 2025-08-16 | Stop reason: HOSPADM

## 2025-08-16 RX ADMIN — LORAZEPAM 2 MG: 1 TABLET ORAL at 07:08
